# Patient Record
Sex: FEMALE | Race: WHITE | NOT HISPANIC OR LATINO | Employment: OTHER | URBAN - METROPOLITAN AREA
[De-identification: names, ages, dates, MRNs, and addresses within clinical notes are randomized per-mention and may not be internally consistent; named-entity substitution may affect disease eponyms.]

---

## 2023-02-14 PROBLEM — Z85.828 HISTORY OF SKIN CANCER: Status: ACTIVE | Noted: 2023-02-14

## 2023-02-14 PROBLEM — E78.5 HYPERLIPIDEMIA: Status: ACTIVE | Noted: 2023-02-14

## 2023-02-14 PROBLEM — G89.29 CHRONIC PAIN OF RIGHT KNEE: Status: ACTIVE | Noted: 2023-02-14

## 2023-02-14 PROBLEM — R41.3 MEMORY CHANGES: Status: ACTIVE | Noted: 2023-02-14

## 2023-02-14 PROBLEM — F39 MOOD DISORDER (CMS-HCC): Status: ACTIVE | Noted: 2023-02-14

## 2023-02-14 PROBLEM — M81.0 OSTEOPOROSIS: Status: ACTIVE | Noted: 2023-02-14

## 2023-02-14 PROBLEM — R51.9 HEADACHE, CHRONIC DAILY: Status: ACTIVE | Noted: 2023-02-14

## 2023-02-14 PROBLEM — N95.2 ATROPHIC VAGINITIS: Status: ACTIVE | Noted: 2023-02-14

## 2023-02-14 PROBLEM — I10 HTN (HYPERTENSION): Status: ACTIVE | Noted: 2023-02-14

## 2023-02-14 PROBLEM — H40.9 GLAUCOMA: Status: ACTIVE | Noted: 2023-02-14

## 2023-02-14 PROBLEM — M25.561 CHRONIC PAIN OF RIGHT KNEE: Status: ACTIVE | Noted: 2023-02-14

## 2023-02-14 PROBLEM — F41.9 ANXIETY: Status: ACTIVE | Noted: 2023-02-14

## 2023-02-14 PROBLEM — E03.9 HYPOTHYROIDISM: Status: ACTIVE | Noted: 2023-02-14

## 2023-02-14 PROBLEM — N18.31 CHRONIC KIDNEY DISEASE, STAGE 3A (MULTI): Status: ACTIVE | Noted: 2023-02-14

## 2023-02-14 PROBLEM — N81.89 PELVIC FLOOR WEAKNESS: Status: ACTIVE | Noted: 2023-02-14

## 2023-02-14 PROBLEM — L65.9 ALOPECIA: Status: ACTIVE | Noted: 2023-02-14

## 2023-02-14 PROBLEM — M53.0 CERVICOCRANIAL SYNDROME: Status: ACTIVE | Noted: 2023-02-14

## 2023-02-14 PROBLEM — Z98.41 STATUS POST RIGHT CATARACT EXTRACTION: Status: ACTIVE | Noted: 2023-02-14

## 2023-02-14 PROBLEM — F39 AFFECTIVE PSYCHOSIS (CMS-HCC): Status: ACTIVE | Noted: 2023-02-14

## 2023-02-14 RX ORDER — LEVOTHYROXINE SODIUM 112 UG/1
112 TABLET ORAL
COMMUNITY
Start: 2017-02-23 | End: 2023-05-05

## 2023-02-14 RX ORDER — AMLODIPINE BESYLATE 5 MG/1
5 TABLET ORAL DAILY
COMMUNITY
Start: 2021-07-26 | End: 2023-05-05

## 2023-02-14 RX ORDER — TIZANIDINE 4 MG/1
4 TABLET ORAL
COMMUNITY
Start: 2021-10-21 | End: 2024-05-15 | Stop reason: WASHOUT

## 2023-02-14 RX ORDER — IBUPROFEN 200 MG
200 TABLET ORAL 2 TIMES DAILY
COMMUNITY
Start: 2021-10-13 | End: 2024-03-11

## 2023-02-14 RX ORDER — GLUCOSAMINE/MSM/CHONDROIT SULF 500-166.6
1 TABLET ORAL DAILY
COMMUNITY
Start: 2021-10-13 | End: 2023-10-26 | Stop reason: ALTCHOICE

## 2023-02-14 RX ORDER — OMEPRAZOLE 20 MG/1
20 CAPSULE, DELAYED RELEASE ORAL DAILY
COMMUNITY
Start: 2017-02-23 | End: 2023-04-25

## 2023-02-14 RX ORDER — QUETIAPINE FUMARATE 100 MG/1
100 TABLET, FILM COATED ORAL DAILY
COMMUNITY
End: 2023-10-26 | Stop reason: ALTCHOICE

## 2023-02-14 RX ORDER — PERPHENAZINE/AMITRIPTYLINE HCL 2 MG-25 MG
TABLET ORAL
COMMUNITY
End: 2023-04-25

## 2023-02-14 RX ORDER — ACETAMINOPHEN 500 MG
500 TABLET ORAL EVERY 4 HOURS PRN
COMMUNITY
Start: 2021-10-13 | End: 2023-04-25

## 2023-02-14 RX ORDER — ATORVASTATIN CALCIUM 40 MG/1
40 TABLET, FILM COATED ORAL NIGHTLY
COMMUNITY
End: 2023-06-02 | Stop reason: SDUPTHER

## 2023-02-14 RX ORDER — LISINOPRIL 10 MG/1
10 TABLET ORAL DAILY
COMMUNITY
End: 2023-05-05

## 2023-04-25 ENCOUNTER — LAB (OUTPATIENT)
Dept: LAB | Facility: LAB | Age: 78
End: 2023-04-25
Payer: MEDICARE

## 2023-04-25 ENCOUNTER — OFFICE VISIT (OUTPATIENT)
Dept: PRIMARY CARE | Facility: CLINIC | Age: 78
End: 2023-04-25
Payer: MEDICARE

## 2023-04-25 VITALS
HEART RATE: 65 BPM | RESPIRATION RATE: 15 BRPM | OXYGEN SATURATION: 95 % | BODY MASS INDEX: 23.92 KG/M2 | TEMPERATURE: 98 F | WEIGHT: 135 LBS | SYSTOLIC BLOOD PRESSURE: 122 MMHG | HEIGHT: 63 IN | DIASTOLIC BLOOD PRESSURE: 68 MMHG

## 2023-04-25 DIAGNOSIS — F31.70 BIPOLAR DISORDER IN FULL REMISSION, MOST RECENT EPISODE UNSPECIFIED TYPE (MULTI): ICD-10-CM

## 2023-04-25 DIAGNOSIS — Z13.89 ENCOUNTER FOR SCREENING FOR OTHER DISORDER: ICD-10-CM

## 2023-04-25 DIAGNOSIS — Z11.59 ENCOUNTER FOR HEPATITIS C SCREENING TEST FOR LOW RISK PATIENT: ICD-10-CM

## 2023-04-25 DIAGNOSIS — R09.89 BRUIT OF LEFT CAROTID ARTERY: ICD-10-CM

## 2023-04-25 DIAGNOSIS — I10 HYPERTENSION, UNSPECIFIED TYPE: ICD-10-CM

## 2023-04-25 DIAGNOSIS — N18.31 CHRONIC KIDNEY DISEASE, STAGE 3A (MULTI): ICD-10-CM

## 2023-04-25 DIAGNOSIS — Z00.00 ENCOUNTER FOR MEDICARE ANNUAL WELLNESS EXAM: Primary | ICD-10-CM

## 2023-04-25 DIAGNOSIS — Z00.00 ENCOUNTER FOR MEDICARE ANNUAL WELLNESS EXAM: ICD-10-CM

## 2023-04-25 DIAGNOSIS — K21.9 GASTROESOPHAGEAL REFLUX DISEASE WITHOUT ESOPHAGITIS: ICD-10-CM

## 2023-04-25 DIAGNOSIS — I65.23 ATHEROSCLEROSIS OF BOTH CAROTID ARTERIES: ICD-10-CM

## 2023-04-25 DIAGNOSIS — E78.5 HYPERLIPIDEMIA, UNSPECIFIED HYPERLIPIDEMIA TYPE: ICD-10-CM

## 2023-04-25 DIAGNOSIS — E03.9 HYPOTHYROIDISM, UNSPECIFIED TYPE: ICD-10-CM

## 2023-04-25 PROBLEM — H40.033 ANATOMICAL NARROW ANGLE BORDERLINE GLAUCOMA OF BOTH EYES: Status: ACTIVE | Noted: 2017-10-30

## 2023-04-25 LAB
ANION GAP IN SER/PLAS: 12 MMOL/L (ref 10–20)
CALCIUM (MG/DL) IN SER/PLAS: 9.3 MG/DL (ref 8.6–10.6)
CARBON DIOXIDE, TOTAL (MMOL/L) IN SER/PLAS: 31 MMOL/L (ref 21–32)
CHLORIDE (MMOL/L) IN SER/PLAS: 104 MMOL/L (ref 98–107)
CHOLESTEROL (MG/DL) IN SER/PLAS: 186 MG/DL (ref 0–199)
CHOLESTEROL IN HDL (MG/DL) IN SER/PLAS: 50.3 MG/DL
CHOLESTEROL/HDL RATIO: 3.7
CREATININE (MG/DL) IN SER/PLAS: 1.12 MG/DL (ref 0.5–1.05)
GFR FEMALE: 51 ML/MIN/1.73M2
GLUCOSE (MG/DL) IN SER/PLAS: 94 MG/DL (ref 74–99)
HEPATITIS C VIRUS AB PRESENCE IN SERUM: NONREACTIVE
LDL: 99 MG/DL (ref 0–99)
POTASSIUM (MMOL/L) IN SER/PLAS: 4.5 MMOL/L (ref 3.5–5.3)
SODIUM (MMOL/L) IN SER/PLAS: 142 MMOL/L (ref 136–145)
THYROTROPIN (MIU/L) IN SER/PLAS BY DETECTION LIMIT <= 0.05 MIU/L: 3.1 MIU/L (ref 0.44–3.98)
TRIGLYCERIDE (MG/DL) IN SER/PLAS: 183 MG/DL (ref 0–149)
UREA NITROGEN (MG/DL) IN SER/PLAS: 21 MG/DL (ref 6–23)
VLDL: 37 MG/DL (ref 0–40)

## 2023-04-25 PROCEDURE — 80061 LIPID PANEL: CPT

## 2023-04-25 PROCEDURE — G0444 DEPRESSION SCREEN ANNUAL: HCPCS | Performed by: FAMILY MEDICINE

## 2023-04-25 PROCEDURE — G0439 PPPS, SUBSEQ VISIT: HCPCS | Performed by: FAMILY MEDICINE

## 2023-04-25 PROCEDURE — 36415 COLL VENOUS BLD VENIPUNCTURE: CPT

## 2023-04-25 PROCEDURE — 1160F RVW MEDS BY RX/DR IN RCRD: CPT | Performed by: FAMILY MEDICINE

## 2023-04-25 PROCEDURE — 80048 BASIC METABOLIC PNL TOTAL CA: CPT

## 2023-04-25 PROCEDURE — 84443 ASSAY THYROID STIM HORMONE: CPT

## 2023-04-25 PROCEDURE — 86803 HEPATITIS C AB TEST: CPT

## 2023-04-25 PROCEDURE — 1159F MED LIST DOCD IN RCRD: CPT | Performed by: FAMILY MEDICINE

## 2023-04-25 PROCEDURE — 1170F FXNL STATUS ASSESSED: CPT | Performed by: FAMILY MEDICINE

## 2023-04-25 PROCEDURE — 99397 PER PM REEVAL EST PAT 65+ YR: CPT | Performed by: FAMILY MEDICINE

## 2023-04-25 PROCEDURE — 3078F DIAST BP <80 MM HG: CPT | Performed by: FAMILY MEDICINE

## 2023-04-25 PROCEDURE — 1036F TOBACCO NON-USER: CPT | Performed by: FAMILY MEDICINE

## 2023-04-25 PROCEDURE — 3074F SYST BP LT 130 MM HG: CPT | Performed by: FAMILY MEDICINE

## 2023-04-25 RX ORDER — ASPIRIN 81 MG/1
81 TABLET ORAL DAILY
Qty: 30 TABLET | Refills: 11 | Status: SHIPPED | OUTPATIENT
Start: 2023-04-25 | End: 2024-06-03

## 2023-04-25 RX ORDER — OMEPRAZOLE 20 MG/1
TABLET, DELAYED RELEASE ORAL
COMMUNITY
End: 2023-10-26 | Stop reason: ALTCHOICE

## 2023-04-25 ASSESSMENT — PATIENT HEALTH QUESTIONNAIRE - PHQ9
2. FEELING DOWN, DEPRESSED OR HOPELESS: SEVERAL DAYS
SUM OF ALL RESPONSES TO PHQ9 QUESTIONS 1 AND 2: 2
1. LITTLE INTEREST OR PLEASURE IN DOING THINGS: SEVERAL DAYS

## 2023-04-25 ASSESSMENT — ACTIVITIES OF DAILY LIVING (ADL)
DOING_HOUSEWORK: INDEPENDENT
GROCERY_SHOPPING: INDEPENDENT
BATHING: INDEPENDENT
MANAGING_FINANCES: INDEPENDENT
DRESSING: INDEPENDENT
TAKING_MEDICATION: INDEPENDENT

## 2023-04-25 ASSESSMENT — LIFESTYLE VARIABLES: HOW MANY STANDARD DRINKS CONTAINING ALCOHOL DO YOU HAVE ON A TYPICAL DAY: PATIENT DOES NOT DRINK

## 2023-04-25 NOTE — PATIENT INSTRUCTIONS
Health Maintenance:  - Colonoscopy: fit kit done with insurance on 11/2/20- no need for testing, getting with insurance  - Mammogram: 2/9/23- repeat due in 1 yr  - PAP: n/a  - Bone density DEXA: 9/8/22- repeat in 2 yr  COVID completed 5 - including the bivalent  Due for Tdap- to get from pharm    Assessment/Plan   Problem List Items Addressed This Visit          Circulatory    HTN (hypertension)    Relevant Orders    Basic Metabolic Panel    Lipid Panel       Genitourinary    Chronic kidney disease, stage 3a    Current Assessment & Plan     Stable  Continue current meds         Relevant Orders    Basic Metabolic Panel    Lipid Panel       Other    Bipolar disorder in full remission (CMS/[facility])    Current Assessment & Plan     Stable- on meds  Followed by psych          Other Visit Diagnoses       Encounter for Medicare annual wellness exam    -  Primary    Relevant Orders    Basic Metabolic Panel    Lipid Panel    Encounter for screening for other disorder         Carotid bruit with Carotid arthrosclerosis   - Checking carotid ultrasound  - checking cholesterol as well  -continue the atorvastatin  - Add Aspirin           Please follow up in 6 months for HTN ans 1 yr for medicare wellness or as needed.       ** If labs or imaging ordered at today's visit, all the non-urgent results will be discussed at your next visit    If you have been referred for a special test or to a specialist please call  [phone] to schedule an appointment.  If you have any further questions, or if develop new or worsened symptoms, please give our office a call at [phone].

## 2023-04-25 NOTE — PROGRESS NOTES
"Subjective   Reason for Visit: Mar Bob is an 77 y.o. female here for a Medicare Wellness visit.     Past Medical, Surgical, and Family History reviewed and updated in chart.    Reviewed all medications by prescribing practitioner or clinical pharmacist (such as prescriptions, OTCs, herbal therapies and supplements) and documented in the medical record.    HPI    Patient Care Team:  Belgica Hardy DO as PCP - [facility]  Sirena Marx MD as PCP - [employer] P[facility]       Health Maintenance:  - Colonoscopy: fit kit done with insurance on 11/2/20- no need for testing, getting with insurance  - Mammogram: 2/9/23- repeat due in 1 yr  - PAP: n/a  - Bone density DEXA: 9/8/22- repeat in 2 yr  COVID completed 5 - including the bivalent  Due for Tdap- to get from pharm          Review of Systems  All systems reviewed and neg if not noted in the HPI above   Objective   Vitals:  /68 (Patient Position: Sitting)   Pulse 65   Temp 36.7 °C (98 °F)   Resp 15   Ht 1.6 m (5' 3\")   Wt 61.2 kg (135 lb)   SpO2 95%   BMI 23.91 kg/m²       Physical Exam  Pleasant  Eyes: conjunctiva non-icteric and eye lids are without obvious rash or drooping. Pupils are symmetric.   Ears, Nose, Mouth, and Throat: External ears and nose appear to be without deformity or rash. No lesions or masses noted. Hearing is grossly intact.   CV: RRR, no murmur  Carotids: no bruits  [place]oud nml  Pulm:CTA B/L  Abd: soft, NTTP, + BS  LE: no edema  Psychiatric: Alert, orientation to person, place, and time. Recent/remote memory as evidenced through face-to-face interaction and discussion appear grossly intact. Mood and affect are normal.          Assessment/Plan   Problem List Items Addressed This Visit          Circulatory    HTN (hypertension)    Relevant Orders    Basic Metabolic Panel    Lipid Panel       Genitourinary    Chronic kidney disease, stage 3a    Current Assessment & Plan     Stable  Continue current meds         Relevant " Orders    Basic Metabolic Panel    Lipid Panel       Other    Bipolar disorder in full remission (CMS/[facility])    Current Assessment & Plan     Stable- on meds  Followed by psych          Other Visit Diagnoses       Encounter for Medicare annual wellness exam    -  Primary    Relevant Orders    Basic Metabolic Panel    Lipid Panel    Encounter for screening for other disorder         Carotid bruit with Carotid arthrosclerosis   - Checking carotid ultrasound  - checking cholesterol as well  -continue the atorvastatin  - Add Asprin      Please follow up in 6 months for HTN ans 1 yr for medicare wellness or as needed.       ** If labs or imaging ordered at today's visit, all the non-urgent results will be discussed at your next visit    If you have been referred for a special test or to a specialist please call  [phone] to schedule an appointment.  If you have any further questions, or if develop new or worsened symptoms, please give our office a call at [phone].

## 2023-04-27 DIAGNOSIS — E03.9 HYPOTHYROIDISM, UNSPECIFIED TYPE: ICD-10-CM

## 2023-04-27 DIAGNOSIS — I15.9 SECONDARY HYPERTENSION: Primary | ICD-10-CM

## 2023-05-05 RX ORDER — LEVOTHYROXINE SODIUM 112 UG/1
TABLET ORAL
Qty: 90 TABLET | Refills: 1 | Status: SHIPPED | OUTPATIENT
Start: 2023-05-05 | End: 2023-05-25

## 2023-05-05 RX ORDER — LISINOPRIL 10 MG/1
TABLET ORAL
Qty: 90 TABLET | Refills: 2 | Status: SHIPPED | OUTPATIENT
Start: 2023-05-05 | End: 2023-05-25

## 2023-05-05 RX ORDER — AMLODIPINE BESYLATE 5 MG/1
TABLET ORAL
Qty: 90 TABLET | Refills: 2 | Status: SHIPPED | OUTPATIENT
Start: 2023-05-05 | End: 2023-05-25

## 2023-05-22 DIAGNOSIS — K21.9 GASTROESOPHAGEAL REFLUX DISEASE, UNSPECIFIED WHETHER ESOPHAGITIS PRESENT: ICD-10-CM

## 2023-05-22 DIAGNOSIS — E03.9 HYPOTHYROIDISM, UNSPECIFIED TYPE: ICD-10-CM

## 2023-05-22 DIAGNOSIS — I15.9 SECONDARY HYPERTENSION: ICD-10-CM

## 2023-05-25 RX ORDER — OMEPRAZOLE 20 MG/1
CAPSULE, DELAYED RELEASE ORAL
Qty: 90 CAPSULE | Refills: 0 | Status: SHIPPED | OUTPATIENT
Start: 2023-05-25 | End: 2023-09-25

## 2023-05-25 RX ORDER — AMLODIPINE BESYLATE 5 MG/1
TABLET ORAL
Qty: 90 TABLET | Refills: 3 | Status: SHIPPED
Start: 2023-05-25 | End: 2023-10-26 | Stop reason: ALTCHOICE

## 2023-05-25 RX ORDER — LISINOPRIL 10 MG/1
TABLET ORAL
Qty: 90 TABLET | Refills: 2 | Status: SHIPPED
Start: 2023-05-25 | End: 2023-10-26 | Stop reason: ALTCHOICE

## 2023-05-25 RX ORDER — LEVOTHYROXINE SODIUM 112 UG/1
TABLET ORAL
Qty: 90 TABLET | Refills: 3 | Status: SHIPPED | OUTPATIENT
Start: 2023-05-25

## 2023-06-02 DIAGNOSIS — E78.5 HYPERLIPIDEMIA, UNSPECIFIED HYPERLIPIDEMIA TYPE: Primary | ICD-10-CM

## 2023-06-02 NOTE — TELEPHONE ENCOUNTER
Rx refill. Atorvastatin. Out of medicine. [facility]. Patient stated all of her other medications were filled.

## 2023-06-05 RX ORDER — ATORVASTATIN CALCIUM 40 MG/1
40 TABLET, FILM COATED ORAL NIGHTLY
Qty: 90 TABLET | Refills: 1 | Status: SHIPPED
Start: 2023-06-05 | End: 2023-10-26 | Stop reason: ALTCHOICE

## 2023-09-13 ENCOUNTER — TELEPHONE (OUTPATIENT)
Dept: PHARMACY | Facility: HOSPITAL | Age: 78
End: 2023-09-13
Payer: MEDICARE

## 2023-09-13 NOTE — TELEPHONE ENCOUNTER
Population Health: Outreach by Ambulatory Pharmacy Team    Payor: [employer] [place]  Reason: Adherence (Past due to  refill)  Medication: Lisinopril 10 mg  Outcome: No answer (Unable to LVM)    Berenice Irene, InesD

## 2023-09-18 NOTE — TELEPHONE ENCOUNTER
I reviewed the progress note and agree with the resident’s findings and plans as written. Case discussed with resident.    Gurwinder Darling, PharmD

## 2023-09-20 PROBLEM — L81.4 OTHER MELANIN HYPERPIGMENTATION: Status: ACTIVE | Noted: 2023-07-12

## 2023-09-20 PROBLEM — C44.319 BASAL CELL CARCINOMA OF SKIN OF OTHER PARTS OF FACE: Status: ACTIVE | Noted: 2023-07-12

## 2023-09-20 PROBLEM — L90.5 SCAR CONDITION AND FIBROSIS OF SKIN: Status: ACTIVE | Noted: 2023-07-12

## 2023-09-20 PROBLEM — L30.9 DERMATITIS: Status: ACTIVE | Noted: 2023-09-20

## 2023-09-20 PROBLEM — D18.01 HEMANGIOMA OF SKIN AND SUBCUTANEOUS TISSUE: Status: ACTIVE | Noted: 2023-07-12

## 2023-09-20 PROBLEM — L23.9 ALLERGIC CONTACT DERMATITIS, UNSPECIFIED CAUSE: Status: ACTIVE | Noted: 2023-07-12

## 2023-09-20 PROBLEM — D48.5 NEOPLASM OF UNCERTAIN BEHAVIOR OF SKIN: Status: ACTIVE | Noted: 2023-07-12

## 2023-09-20 PROBLEM — L65.9 NONSCARRING HAIR LOSS, UNSPECIFIED: Status: ACTIVE | Noted: 2023-07-12

## 2023-09-20 PROBLEM — Z85.828 PERSONAL HISTORY OF OTHER MALIGNANT NEOPLASM OF SKIN: Status: ACTIVE | Noted: 2023-07-12

## 2023-09-20 PROBLEM — D22.5 MELANOCYTIC NEVI OF TRUNK: Status: ACTIVE | Noted: 2023-07-12
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
## 2023-09-20 PROBLEM — L91.8 OTHER HYPERTROPHIC DISORDERS OF THE SKIN: Status: ACTIVE | Noted: 2023-07-12

## 2023-09-20 PROBLEM — R21 RASH AND OTHER NONSPECIFIC SKIN ERUPTION: Status: ACTIVE | Noted: 2023-07-12

## 2023-09-20 PROBLEM — L82.1 OTHER SEBORRHEIC KERATOSIS: Status: ACTIVE | Noted: 2023-07-12

## 2023-09-20 PROBLEM — L53.9 ERYTHEMA: Status: ACTIVE | Noted: 2023-09-20

## 2023-09-20 PROBLEM — L82.0 INFLAMED SEBORRHEIC KERATOSIS: Status: ACTIVE | Noted: 2023-07-12

## 2023-09-20 RX ORDER — CALCIUM CARBONATE 600 MG
TABLET ORAL
COMMUNITY
End: 2024-05-15 | Stop reason: WASHOUT

## 2023-09-20 RX ORDER — TRIAMCINOLONE ACETONIDE 1 MG/G
CREAM TOPICAL
COMMUNITY
Start: 2022-12-06 | End: 2023-10-26 | Stop reason: ALTCHOICE

## 2023-09-20 RX ORDER — CIPROFLOXACIN 500 MG/1
500 TABLET ORAL 2 TIMES DAILY
COMMUNITY
Start: 2023-09-04 | End: 2023-09-11

## 2023-09-20 RX ORDER — AMLODIPINE BESYLATE 5 MG/1
1 TABLET ORAL DAILY
COMMUNITY
Start: 2021-07-26 | End: 2023-10-26 | Stop reason: ALTCHOICE

## 2023-09-20 RX ORDER — OMEPRAZOLE 20 MG/1
1 CAPSULE, DELAYED RELEASE ORAL DAILY
COMMUNITY
Start: 2017-02-23 | End: 2023-10-26 | Stop reason: ALTCHOICE

## 2023-09-20 RX ORDER — ATORVASTATIN CALCIUM 40 MG/1
1 TABLET, FILM COATED ORAL NIGHTLY
COMMUNITY
End: 2024-02-12

## 2023-09-20 RX ORDER — KETOCONAZOLE 20 MG/G
CREAM TOPICAL
COMMUNITY
Start: 2021-06-16 | End: 2023-10-26 | Stop reason: ALTCHOICE

## 2023-09-20 RX ORDER — PERPHENAZINE/AMITRIPTYLINE HCL 2 MG-25 MG
TABLET ORAL
COMMUNITY

## 2023-09-20 RX ORDER — TURMERIC 100 %
POWDER (GRAM) MISCELLANEOUS
COMMUNITY
End: 2023-10-26 | Stop reason: ALTCHOICE

## 2023-09-20 RX ORDER — LEVOTHYROXINE SODIUM 112 UG/1
1 TABLET ORAL DAILY
COMMUNITY
Start: 2017-02-23 | End: 2023-10-26 | Stop reason: ALTCHOICE

## 2023-09-20 RX ORDER — VIT C/E/ZN/COPPR/LUTEIN/ZEAXAN 250MG-90MG
CAPSULE ORAL
COMMUNITY

## 2023-09-20 RX ORDER — LISINOPRIL 10 MG/1
1 TABLET ORAL DAILY
COMMUNITY
End: 2023-10-26 | Stop reason: ALTCHOICE

## 2023-09-21 PROBLEM — D22.62 MELANOCYTIC NEVI OF LEFT UPPER LIMB, INCLUDING SHOULDER: Status: ACTIVE | Noted: 2023-07-12

## 2023-09-21 RX ORDER — QUETIAPINE FUMARATE 100 MG/1
1 TABLET, FILM COATED ORAL DAILY
COMMUNITY
End: 2023-10-26 | Stop reason: SDUPTHER

## 2023-09-21 RX ORDER — GARLIC 1000 MG
CAPSULE ORAL
COMMUNITY
End: 2023-10-26 | Stop reason: ALTCHOICE

## 2023-09-23 DIAGNOSIS — K21.9 GASTROESOPHAGEAL REFLUX DISEASE, UNSPECIFIED WHETHER ESOPHAGITIS PRESENT: ICD-10-CM

## 2023-09-23 DIAGNOSIS — I10 HYPERTENSION, UNSPECIFIED TYPE: Primary | ICD-10-CM

## 2023-09-25 RX ORDER — LISINOPRIL 10 MG/1
10 TABLET ORAL DAILY
Qty: 90 TABLET | Refills: 2 | Status: SHIPPED | OUTPATIENT
Start: 2023-09-25 | End: 2024-05-21

## 2023-09-25 RX ORDER — AMLODIPINE BESYLATE 5 MG/1
5 TABLET ORAL DAILY
Qty: 90 TABLET | Refills: 2 | Status: SHIPPED | OUTPATIENT
Start: 2023-09-25

## 2023-09-25 RX ORDER — OMEPRAZOLE 20 MG/1
CAPSULE, DELAYED RELEASE ORAL
Qty: 90 CAPSULE | Refills: 0 | Status: SHIPPED | OUTPATIENT
Start: 2023-09-25 | End: 2024-02-12

## 2023-10-19 ENCOUNTER — TELEPHONE (OUTPATIENT)
Dept: PHARMACY | Facility: HOSPITAL | Age: 78
End: 2023-10-19
Payer: MEDICARE

## 2023-10-19 NOTE — TELEPHONE ENCOUNTER
Population Health: Outreach by Ambulatory Pharmacy Team    Payor: [employer] [place]  Reason: Adherence  Medication: atorvastatin 40mg (refill due 9/3)   Outcome: Patient Reached: Claims Adherence, Patient has recently picked up refills for all her medications.     Renea Pearl

## 2023-10-26 ENCOUNTER — ANCILLARY PROCEDURE (OUTPATIENT)
Dept: RADIOLOGY | Facility: CLINIC | Age: 78
End: 2023-10-26
Payer: MEDICARE

## 2023-10-26 ENCOUNTER — LAB (OUTPATIENT)
Dept: LAB | Facility: LAB | Age: 78
End: 2023-10-26
Payer: MEDICARE

## 2023-10-26 ENCOUNTER — OFFICE VISIT (OUTPATIENT)
Dept: PRIMARY CARE | Facility: CLINIC | Age: 78
End: 2023-10-26
Payer: MEDICARE

## 2023-10-26 VITALS
HEIGHT: 63 IN | DIASTOLIC BLOOD PRESSURE: 60 MMHG | RESPIRATION RATE: 12 BRPM | TEMPERATURE: 96.7 F | WEIGHT: 135.6 LBS | BODY MASS INDEX: 24.03 KG/M2 | OXYGEN SATURATION: 98 % | HEART RATE: 64 BPM | SYSTOLIC BLOOD PRESSURE: 120 MMHG

## 2023-10-26 DIAGNOSIS — M25.562 CHRONIC PAIN OF BOTH KNEES: ICD-10-CM

## 2023-10-26 DIAGNOSIS — E78.1 HYPERTRIGLYCERIDEMIA: ICD-10-CM

## 2023-10-26 DIAGNOSIS — F39 AFFECTIVE PSYCHOSIS (CMS-HCC): ICD-10-CM

## 2023-10-26 DIAGNOSIS — G89.29 CHRONIC PAIN OF BOTH KNEES: ICD-10-CM

## 2023-10-26 DIAGNOSIS — I10 PRIMARY HYPERTENSION: Primary | ICD-10-CM

## 2023-10-26 DIAGNOSIS — E78.5 HYPERLIPIDEMIA, UNSPECIFIED HYPERLIPIDEMIA TYPE: ICD-10-CM

## 2023-10-26 DIAGNOSIS — Z12.31 ENCOUNTER FOR SCREENING MAMMOGRAM FOR BREAST CANCER: ICD-10-CM

## 2023-10-26 DIAGNOSIS — M79.645 PAIN IN FINGER OF BOTH HANDS: ICD-10-CM

## 2023-10-26 DIAGNOSIS — M25.561 CHRONIC PAIN OF BOTH KNEES: ICD-10-CM

## 2023-10-26 DIAGNOSIS — I10 PRIMARY HYPERTENSION: ICD-10-CM

## 2023-10-26 DIAGNOSIS — N18.31 CHRONIC KIDNEY DISEASE, STAGE 3A (MULTI): ICD-10-CM

## 2023-10-26 DIAGNOSIS — M79.644 PAIN IN FINGER OF BOTH HANDS: ICD-10-CM

## 2023-10-26 DIAGNOSIS — Z23 NEED FOR VACCINATION: ICD-10-CM
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
## 2023-10-26 PROBLEM — R21 RASH AND OTHER NONSPECIFIC SKIN ERUPTION: Status: RESOLVED | Noted: 2023-07-12 | Resolved: 2023-10-26

## 2023-10-26 PROBLEM — L82.1 OTHER SEBORRHEIC KERATOSIS: Status: RESOLVED | Noted: 2023-07-12 | Resolved: 2023-10-26

## 2023-10-26 LAB
ALBUMIN SERPL BCP-MCNC: 4.1 G/DL (ref 3.4–5)
ALP SERPL-CCNC: 83 U/L (ref 33–136)
ALT SERPL W P-5'-P-CCNC: 14 U/L (ref 7–45)
ANION GAP SERPL CALC-SCNC: 11 MMOL/L (ref 10–20)
AST SERPL W P-5'-P-CCNC: 19 U/L (ref 9–39)
BILIRUB SERPL-MCNC: 0.7 MG/DL (ref 0–1.2)
BUN SERPL-MCNC: 21 MG/DL (ref 6–23)
CALCIUM SERPL-MCNC: 9.2 MG/DL (ref 8.6–10.6)
CHLORIDE SERPL-SCNC: 106 MMOL/L (ref 98–107)
CHOLEST SERPL-MCNC: 146 MG/DL (ref 0–199)
CHOLESTEROL/HDL RATIO: 3.6
CO2 SERPL-SCNC: 27 MMOL/L (ref 21–32)
CREAT SERPL-MCNC: 1.03 MG/DL (ref 0.5–1.05)
GFR SERPL CREATININE-BSD FRML MDRD: 56 ML/MIN/1.73M*2
GLUCOSE SERPL-MCNC: 92 MG/DL (ref 74–99)
HDLC SERPL-MCNC: 40.3 MG/DL
LDLC SERPL CALC-MCNC: 70 MG/DL
NON HDL CHOLESTEROL: 106 MG/DL (ref 0–149)
POTASSIUM SERPL-SCNC: 4.2 MMOL/L (ref 3.5–5.3)
PROT SERPL-MCNC: 7.2 G/DL (ref 6.4–8.2)
SODIUM SERPL-SCNC: 140 MMOL/L (ref 136–145)
TRIGL SERPL-MCNC: 181 MG/DL (ref 0–149)
VLDL: 36 MG/DL (ref 0–40)

## 2023-10-26 PROCEDURE — 80061 LIPID PANEL: CPT

## 2023-10-26 PROCEDURE — 1126F AMNT PAIN NOTED NONE PRSNT: CPT | Performed by: FAMILY MEDICINE

## 2023-10-26 PROCEDURE — 3078F DIAST BP <80 MM HG: CPT | Performed by: FAMILY MEDICINE

## 2023-10-26 PROCEDURE — 73564 X-RAY EXAM KNEE 4 OR MORE: CPT | Mod: BILATERAL PROCEDURE | Performed by: RADIOLOGY

## 2023-10-26 PROCEDURE — 36415 COLL VENOUS BLD VENIPUNCTURE: CPT

## 2023-10-26 PROCEDURE — 80053 COMPREHEN METABOLIC PANEL: CPT

## 2023-10-26 PROCEDURE — 99214 OFFICE O/P EST MOD 30 MIN: CPT | Performed by: FAMILY MEDICINE

## 2023-10-26 PROCEDURE — 73120 X-RAY EXAM OF HAND: CPT | Mod: 50,FY

## 2023-10-26 PROCEDURE — 1036F TOBACCO NON-USER: CPT | Performed by: FAMILY MEDICINE

## 2023-10-26 PROCEDURE — 73120 X-RAY EXAM OF HAND: CPT | Mod: BILATERAL PROCEDURE | Performed by: RADIOLOGY

## 2023-10-26 PROCEDURE — 73564 X-RAY EXAM KNEE 4 OR MORE: CPT | Mod: 50

## 2023-10-26 PROCEDURE — 3074F SYST BP LT 130 MM HG: CPT | Performed by: FAMILY MEDICINE

## 2023-10-26 PROCEDURE — 1159F MED LIST DOCD IN RCRD: CPT | Performed by: FAMILY MEDICINE

## 2023-10-26 PROCEDURE — 1160F RVW MEDS BY RX/DR IN RCRD: CPT | Performed by: FAMILY MEDICINE

## 2023-10-26 RX ORDER — QUETIAPINE FUMARATE 100 MG/1
100 TABLET, FILM COATED ORAL DAILY
Qty: 90 TABLET | Refills: 0 | Status: SHIPPED | OUTPATIENT
Start: 2023-10-26 | End: 2023-12-15 | Stop reason: SDUPTHER

## 2023-10-26 ASSESSMENT — PATIENT HEALTH QUESTIONNAIRE - PHQ9
SUM OF ALL RESPONSES TO PHQ9 QUESTIONS 1 AND 2: 0
1. LITTLE INTEREST OR PLEASURE IN DOING THINGS: NOT AT ALL
2. FEELING DOWN, DEPRESSED OR HOPELESS: NOT AT ALL

## 2023-10-26 NOTE — PROGRESS NOTES
"Subjective   Patient ID: Mar Bob is a 78 y.o. female who presents for Follow-up (Pt is here for HLD fuv.).    HPI     Health Maintenance:  - Colonoscopy: fit kit done with insurance on 11/2/20- no need for testing, getting with insurance  - Mammogram: 2/9/23- repeat due in 1 yr  - PAP: n/a  - Bone density DEXA: 9/8/22- repeat in 2 yr    Completed RSV and flu from pharm  Getting COVID today      C/o B/l hands/ fingers with soreness and right knee with pain  Thinking due to arthritis- (does not want PT or medications at this time)  Noted to have nodules  on fingers (DIP  Hard to type on computer due to finger pain    HTN  BP at goal today in office.  Using medications without issues.  Denies CP, SOB, palpitations, change in vision, dizziness, N/V.      Review of Systems  All systems reviewed and neg if not noted in the HPI above       Objective   /60 (Patient Position: Sitting)   Pulse 64   Temp 35.9 °C (96.7 °F)   Resp 12   Ht 1.6 m (5' 3\")   Wt 61.5 kg (135 lb 9.6 oz)   SpO2 98%   BMI 24.02 kg/m²     Physical Exam  Vitals reviewed.   Constitutional:       Appearance: Normal appearance.   HENT:      Head: Normocephalic.   Eyes:      Extraocular Movements: Extraocular movements intact.   Cardiovascular:      Rate and Rhythm: Normal rate and regular rhythm.      Heart sounds: Normal heart sounds. No murmur heard.  Pulmonary:      Effort: Pulmonary effort is normal. No respiratory distress.      Breath sounds: Normal breath sounds. No wheezing.   Musculoskeletal:      Right hand: Deformity and tenderness present. Decreased range of motion.      Left hand: Deformity and tenderness present. Decreased range of motion.      Right knee: Deformity present. No effusion. Decreased range of motion. Tenderness present.      Left knee: Deformity present. No effusion. Decreased range of motion. Tenderness present.   Skin:     General: Skin is warm and dry.   Neurological:      General: No focal deficit present. "      Mental Status: She is alert and oriented to person, place, and time.   Psychiatric:         Mood and Affect: Mood normal.         Behavior: Behavior normal.         Thought Content: Thought content normal.         Judgment: Judgment normal.         Assessment/Plan   Problem List Items Addressed This Visit             ICD-10-CM    Chronic kidney disease, stage 3a (CMS/HCC) N18.31    HTN (hypertension) - Primary I10    Relevant Orders    Comprehensive Metabolic Panel    Lipid Panel    Hyperlipidemia E78.5    Relevant Orders    Comprehensive Metabolic Panel    Lipid Panel     Other Visit Diagnoses         Codes    Need for vaccination     Z23    Hypertriglyceridemia     E78.1    Relevant Orders    Comprehensive Metabolic Panel    Lipid Panel    Encounter for screening mammogram for breast cancer     Z12.31    Relevant Orders    BI mammo bilateral screening tomosynthesis    Chronic pain of both knees     M25.561, M25.562, G89.29    Relevant Orders    XR knee 4+ views bilateral    Pain in finger of both hands     M79.645, M79.644    Relevant Orders    XR hand 1-2 views bilateral          Please follow up in 6months for medicare wellness or as needed.

## 2023-10-26 NOTE — PATIENT INSTRUCTIONS
COVID,tdap and pneumonia 23 can get with your local pharm    Knee and hand pain  - ok to add Turmeric Curcumin  450mg  - Topical creams- Voltaren gel, Salonpas, Icy hot, Bio freeze, Capsaicin, Asper cream, or Tiger balm (these are all over the counter)   - xrays of hands and knee        Please follow up in 6months for medicare wellness or as needed.       ** If labs or imaging ordered at today's visit, all the non-urgent results will be discussed at your next visit    If you have been referred for a special test or to a specialist please call  [phone] to schedule an appointment.  If you have any further questions, or if develop new or worsened symptoms, please give our office a call at [phone].

## 2023-11-06 ENCOUNTER — APPOINTMENT (OUTPATIENT)
Dept: BEHAVIORAL HEALTH | Facility: CLINIC | Age: 78
End: 2023-11-06
Payer: MEDICARE

## 2023-12-15 ENCOUNTER — TELEMEDICINE (OUTPATIENT)
Dept: BEHAVIORAL HEALTH | Facility: CLINIC | Age: 78
End: 2023-12-15
Payer: MEDICARE

## 2023-12-15 DIAGNOSIS — F39 AFFECTIVE PSYCHOSIS (CMS-HCC): ICD-10-CM

## 2023-12-15 DIAGNOSIS — F31.78 BIPOLAR DISORDER, IN FULL REMISSION, MOST RECENT EPISODE MIXED (MULTI): ICD-10-CM

## 2023-12-15 PROCEDURE — 99214 OFFICE O/P EST MOD 30 MIN: CPT | Performed by: PSYCHIATRY & NEUROLOGY

## 2023-12-15 RX ORDER — QUETIAPINE FUMARATE 100 MG/1
100 TABLET, FILM COATED ORAL DAILY
Qty: 90 TABLET | Refills: 1 | Status: SHIPPED | OUTPATIENT
Start: 2023-12-15 | End: 2024-05-08 | Stop reason: SDUPTHER

## 2023-12-15 SDOH — HEALTH STABILITY: PHYSICAL HEALTH: ON AVERAGE, HOW MANY DAYS PER WEEK DO YOU ENGAGE IN MODERATE TO STRENUOUS EXERCISE (LIKE A BRISK WALK)?: 7 DAYS

## 2023-12-15 SDOH — ECONOMIC STABILITY: TRANSPORTATION INSECURITY
IN THE PAST 12 MONTHS, HAS LACK OF TRANSPORTATION KEPT YOU FROM MEETINGS, WORK, OR FROM GETTING THINGS NEEDED FOR DAILY LIVING?: NO

## 2023-12-15 SDOH — ECONOMIC STABILITY: TRANSPORTATION INSECURITY
IN THE PAST 12 MONTHS, HAS THE LACK OF TRANSPORTATION KEPT YOU FROM MEDICAL APPOINTMENTS OR FROM GETTING MEDICATIONS?: NO

## 2023-12-15 SDOH — ECONOMIC STABILITY: INCOME INSECURITY: IN THE LAST 12 MONTHS, WAS THERE A TIME WHEN YOU WERE NOT ABLE TO PAY THE MORTGAGE OR RENT ON TIME?: NO

## 2023-12-15 SDOH — HEALTH STABILITY: PHYSICAL HEALTH: ON AVERAGE, HOW MANY MINUTES DO YOU ENGAGE IN EXERCISE AT THIS LEVEL?: 30 MIN

## 2023-12-15 SDOH — ECONOMIC STABILITY: FOOD INSECURITY: WITHIN THE PAST 12 MONTHS, THE FOOD YOU BOUGHT JUST DIDN'T LAST AND YOU DIDN'T HAVE MONEY TO GET MORE.: NEVER TRUE

## 2023-12-15 SDOH — ECONOMIC STABILITY: HOUSING INSECURITY
IN THE LAST 12 MONTHS, WAS THERE A TIME WHEN YOU DID NOT HAVE A STEADY PLACE TO SLEEP OR SLEPT IN A SHELTER (INCLUDING NOW)?: NO

## 2023-12-15 SDOH — ECONOMIC STABILITY: FOOD INSECURITY: WITHIN THE PAST 12 MONTHS, YOU WORRIED THAT YOUR FOOD WOULD RUN OUT BEFORE YOU GOT MONEY TO BUY MORE.: NEVER TRUE

## 2023-12-15 SDOH — ECONOMIC STABILITY: GENERAL
WHICH OF THE FOLLOWING DO YOU KNOW HOW TO USE AND HAVE ACCESS TO EVERY DAY? (CHOOSE ALL THAT APPLY): DESKTOP COMPUTER, LAPTOP COMPUTER, OR TABLET WITH BROADBAND INTERNET CONNECTION;SMARTPHONE WITH CELLULAR DATA PLAN

## 2023-12-15 SDOH — ECONOMIC STABILITY: GENERAL
WHICH OF THE FOLLOWING WOULD YOU LIKE TO GET CONNECTED TO IN ORDER TO RECEIVE A DISCOUNT OR FOR FREE? (CHOOSE ALL THAT APPLY): NONE OF THESE

## 2023-12-15 SDOH — ECONOMIC STABILITY: HOUSING INSECURITY: IN THE LAST 12 MONTHS, HOW MANY PLACES HAVE YOU LIVED?: 1

## 2023-12-15 ASSESSMENT — SOCIAL DETERMINANTS OF HEALTH (SDOH)
WITHIN THE LAST YEAR, HAVE YOU BEEN AFRAID OF YOUR PARTNER OR EX-PARTNER?: NO
HOW OFTEN DO YOU ATTENT MEETINGS OF THE CLUB OR ORGANIZATION YOU BELONG TO?: MORE THAN 4 TIMES PER YEAR
HOW OFTEN DO YOU GET TOGETHER WITH FRIENDS OR RELATIVES?: MORE THAN THREE TIMES A WEEK
DO YOU BELONG TO ANY CLUBS OR ORGANIZATIONS SUCH AS CHURCH GROUPS UNIONS, FRATERNAL OR ATHLETIC GROUPS, OR SCHOOL GROUPS?: YES
IN THE PAST 12 MONTHS, HAS THE ELECTRIC, GAS, OIL, OR WATER COMPANY THREATENED TO SHUT OFF SERVICE IN YOUR HOME?: NO
HOW OFTEN DO YOU ATTENT MEETINGS OF THE CLUB OR ORGANIZATION YOU BELONG TO?: MORE THAN 4 TIMES PER YEAR
HOW OFTEN DO YOU GET TOGETHER WITH FRIENDS OR RELATIVES?: MORE THAN THREE TIMES A WEEK
WITHIN THE LAST YEAR, HAVE YOU BEEN HUMILIATED OR EMOTIONALLY ABUSED IN OTHER WAYS BY YOUR PARTNER OR EX-PARTNER?: NO
IN A TYPICAL WEEK, HOW MANY TIMES DO YOU TALK ON THE PHONE WITH FAMILY, FRIENDS, OR NEIGHBORS?: MORE THAN THREE TIMES A WEEK
IN A TYPICAL WEEK, HOW MANY TIMES DO YOU TALK ON THE PHONE WITH FAMILY, FRIENDS, OR NEIGHBORS?: MORE THAN THREE TIMES A WEEK
WITHIN THE LAST YEAR, HAVE YOU BEEN KICKED, HIT, SLAPPED, OR OTHERWISE PHYSICALLY HURT BY YOUR PARTNER OR EX-PARTNER?: NO
WITHIN THE LAST YEAR, HAVE TO BEEN RAPED OR FORCED TO HAVE ANY KIND OF SEXUAL ACTIVITY BY YOUR PARTNER OR EX-PARTNER?: NO
HOW OFTEN DO YOU ATTEND CHURCH OR RELIGIOUS SERVICES?: MORE THAN 4 TIMES PER YEAR
DO YOU BELONG TO ANY CLUBS OR ORGANIZATIONS SUCH AS CHURCH GROUPS UNIONS, FRATERNAL OR ATHLETIC GROUPS, OR SCHOOL GROUPS?: YES
HOW HARD IS IT FOR YOU TO PAY FOR THE VERY BASICS LIKE FOOD, HOUSING, MEDICAL CARE, AND HEATING?: NOT HARD AT ALL

## 2023-12-15 ASSESSMENT — LIFESTYLE VARIABLES
HOW MANY STANDARD DRINKS CONTAINING ALCOHOL DO YOU HAVE ON A TYPICAL DAY: PATIENT DOES NOT DRINK
HOW OFTEN DO YOU HAVE SIX OR MORE DRINKS ON ONE OCCASION: NEVER
SKIP TO QUESTIONS 9-10: 1
HOW OFTEN DO YOU HAVE A DRINK CONTAINING ALCOHOL: NEVER
AUDIT-C TOTAL SCORE: 0

## 2023-12-15 NOTE — PROGRESS NOTES
Subjective   Patient ID: Mar Bob is a 78 y.o. female who presents for No chief complaint on file. I am doing     The patient is alert, fully oriented, language is intact, and recent and remote memory intact. The patient denies any suicidal or homicidal ideation or plans. The patient presents with no depressive, manic or psychotic symptoms. Thought is logical and clear. No disturbances of judgment or insight are exhibited. No behavioral disturbances are present on examination.    PMH: The patient reports a history of a break with reality with significant mood disturbance in 1997.     FH: The patient's sister has had similar mood disturbance and break with reality leading to a hospitalization. She was diagnosed with bipolar. One niece committed suicide. This niece is one of twins and both have suffered from mood disturbance. The paternal grandmother also had depression with fear possible psychosis. The acute illness happened after menopause with both the patient and her sister.         Review of Systems   Neurological:         The patient is alert, fully oriented, language is intact, and recent and remote memory intact. The patient denies any suicidal or homicidal ideation or plans. The patient presents with no depressive, manic or psychotic symptoms. Thought is logical and clear. No disturbances of judgment or insight are exhibited. No behavioral disturbances are present on examination.       Psychiatric/Behavioral:          The patient is alert, fully oriented, language is intact, and recent and remote memory intact. The patient denies any suicidal or homicidal ideation or plans. The patient presents with no depressive, manic or psychotic symptoms. Thought is logical and clear. No disturbances of judgment or insight are exhibited. No behavioral disturbances are present on examination.           Objective   Physical Exam  Neurological:      General: No focal deficit present.      Mental Status: She is alert  and oriented to person, place, and time. Mental status is at baseline.      Comments: The patient is alert, fully oriented, language is intact, and recent and remote memory intact. The patient denies any suicidal or homicidal ideation or plans. The patient presents with no depressive, manic or psychotic symptoms. Thought is logical and clear. No disturbances of judgment or insight are exhibited. No behavioral disturbances are present on examination.       Psychiatric:         Mood and Affect: Mood normal.         Behavior: Behavior normal.         Thought Content: Thought content normal.         Judgment: Judgment normal.      Comments: The patient is alert, fully oriented, language is intact, and recent and remote memory intact. The patient denies any suicidal or homicidal ideation or plans. The patient presents with no depressive, manic or psychotic symptoms. Thought is logical and clear. No disturbances of judgment or insight are exhibited. No behavioral disturbances are present on examination.             Lab Review:       Assessment/Plan   The risks, benefits & alternatives to the medications prescribed were explained to you today. You were able to understand & repeat these risks, benefits & alternatives to this prescribed medication. You have agreed to proceed with treatment with the medications discussed based on the conclusion that the benefit outweighs the risks of this treatment regimen: continue quetiapine 100 mg daily for treatment of bipolar affective disorder. Follow up in 6 months or sooner if needed.

## 2024-02-07 DIAGNOSIS — E78.5 HYPERLIPIDEMIA, UNSPECIFIED HYPERLIPIDEMIA TYPE: Primary | ICD-10-CM

## 2024-02-07 DIAGNOSIS — K21.9 GASTROESOPHAGEAL REFLUX DISEASE, UNSPECIFIED WHETHER ESOPHAGITIS PRESENT: ICD-10-CM

## 2024-02-12 RX ORDER — ATORVASTATIN CALCIUM 40 MG/1
40 TABLET, FILM COATED ORAL NIGHTLY
Qty: 90 TABLET | Refills: 1 | Status: SHIPPED | OUTPATIENT
Start: 2024-02-12

## 2024-02-12 RX ORDER — OMEPRAZOLE 20 MG/1
CAPSULE, DELAYED RELEASE ORAL
Qty: 90 CAPSULE | Refills: 0 | Status: SHIPPED | OUTPATIENT
Start: 2024-02-12 | End: 2024-05-21

## 2024-03-05 ENCOUNTER — HOSPITAL ENCOUNTER (OUTPATIENT)
Dept: RADIOLOGY | Facility: CLINIC | Age: 79
Discharge: HOME | End: 2024-03-05
Payer: MEDICARE

## 2024-03-05 VITALS — WEIGHT: 130 LBS | HEIGHT: 63 IN | BODY MASS INDEX: 23.04 KG/M2

## 2024-03-05 DIAGNOSIS — Z12.31 ENCOUNTER FOR SCREENING MAMMOGRAM FOR BREAST CANCER: ICD-10-CM

## 2024-03-05 PROCEDURE — 77063 BREAST TOMOSYNTHESIS BI: CPT

## 2024-03-05 PROCEDURE — 77067 SCR MAMMO BI INCL CAD: CPT | Performed by: RADIOLOGY

## 2024-03-05 PROCEDURE — 77063 BREAST TOMOSYNTHESIS BI: CPT | Performed by: RADIOLOGY

## 2024-03-11 ENCOUNTER — OFFICE VISIT (OUTPATIENT)
Dept: OPHTHALMOLOGY | Facility: CLINIC | Age: 79
End: 2024-03-11
Payer: MEDICARE

## 2024-03-11 DIAGNOSIS — H35.3131 EARLY DRY STAGE NONEXUDATIVE AGE-RELATED MACULAR DEGENERATION OF BOTH EYES: ICD-10-CM

## 2024-03-11 DIAGNOSIS — H40.033 ANATOMICAL NARROW ANGLE BORDERLINE GLAUCOMA OF BOTH EYES: Primary | ICD-10-CM

## 2024-03-11 PROCEDURE — 92134 CPTRZ OPH DX IMG PST SGM RTA: CPT | Mod: BILATERAL PROCEDURE | Performed by: OPHTHALMOLOGY

## 2024-03-11 PROCEDURE — 99214 OFFICE O/P EST MOD 30 MIN: CPT | Performed by: OPHTHALMOLOGY

## 2024-03-11 RX ORDER — TURMERIC 100 %
POWDER (GRAM) MISCELLANEOUS
COMMUNITY

## 2024-03-11 RX ORDER — GLUCOSAMINE/MSM/CHONDROIT SULF 500-166.6
TABLET ORAL
COMMUNITY
Start: 2021-10-13 | End: 2024-05-15 | Stop reason: WASHOUT

## 2024-03-11 RX ORDER — GARLIC 1000 MG
CAPSULE ORAL
COMMUNITY

## 2024-03-11 RX ORDER — FLAXSEED OIL 1000 MG
CAPSULE ORAL
COMMUNITY

## 2024-03-11 ASSESSMENT — VISUAL ACUITY
OS_CC+: -1
OS_CC: 20/20
OD_CC+: -1
OD_CC: 20/40
METHOD: SNELLEN - LINEAR

## 2024-03-11 ASSESSMENT — ENCOUNTER SYMPTOMS
HEMATOLOGIC/LYMPHATIC NEGATIVE: 0
GASTROINTESTINAL NEGATIVE: 0
MUSCULOSKELETAL NEGATIVE: 0
ALLERGIC/IMMUNOLOGIC NEGATIVE: 0
NEUROLOGICAL NEGATIVE: 0
PSYCHIATRIC NEGATIVE: 0
CONSTITUTIONAL NEGATIVE: 0
ENDOCRINE NEGATIVE: 0
RESPIRATORY NEGATIVE: 0
CARDIOVASCULAR NEGATIVE: 0
EYES NEGATIVE: 0

## 2024-03-11 ASSESSMENT — PACHYMETRY
OS_CT(UM): 598
OD_CT(UM): 601

## 2024-03-11 ASSESSMENT — TONOMETRY
OS_IOP_MMHG: 13
IOP_METHOD: GOLDMANN APPLANATION
OD_IOP_MMHG: 15

## 2024-03-11 ASSESSMENT — CONF VISUAL FIELD
OD_INFERIOR_NASAL_RESTRICTION: 0
OS_INFERIOR_NASAL_RESTRICTION: 0
OD_NORMAL: 1
OS_INFERIOR_TEMPORAL_RESTRICTION: 0
OD_SUPERIOR_TEMPORAL_RESTRICTION: 0
OS_SUPERIOR_TEMPORAL_RESTRICTION: 0
OD_INFERIOR_TEMPORAL_RESTRICTION: 0
OD_SUPERIOR_NASAL_RESTRICTION: 0
OS_NORMAL: 1
OS_SUPERIOR_NASAL_RESTRICTION: 0

## 2024-03-11 ASSESSMENT — EXTERNAL EXAM - RIGHT EYE: OD_EXAM: NORMAL

## 2024-03-11 ASSESSMENT — EXTERNAL EXAM - LEFT EYE: OS_EXAM: NORMAL

## 2024-03-11 ASSESSMENT — SLIT LAMP EXAM - LIDS
COMMENTS: GOOD POSITION
COMMENTS: GOOD POSITION

## 2024-03-11 ASSESSMENT — CUP TO DISC RATIO: OS_RATIO: .3

## 2024-03-11 NOTE — PROGRESS NOTES
Pseudophakia OU     Doing well, VA OD limited by amblyopia     now s/p YAG cap, monitor          hx of anatomically narrow angles, both eyes:     s.p LPI with Dr. Gonzalez at [facility] normal     nerves healthy    OCT, Retina - OU - Both Eyes          Right Eye  Quality was good. Findings include normal foveal contour.     Left Eye  Quality was good. Findings include normal foveal contour.     Notes  Small subretinal deposits         OCT, Optic Nerve - OU - Both Eyes          Right Eye  Images reviewed and comparison made to baseline.     Left Eye  Images reviewed and comparison made to baseline.     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 Ybarra for further care  Amy MAK    
,DirectAddress_Unknown

## 2024-04-08 ENCOUNTER — APPOINTMENT (OUTPATIENT)
Dept: PRIMARY CARE | Facility: CLINIC | Age: 79
End: 2024-04-08
Payer: MEDICARE

## 2024-04-09 ENCOUNTER — APPOINTMENT (OUTPATIENT)
Dept: PRIMARY CARE | Facility: CLINIC | Age: 79
End: 2024-04-09
Payer: MEDICARE

## 2024-05-08 DIAGNOSIS — F39 AFFECTIVE PSYCHOSIS (CMS-HCC): ICD-10-CM

## 2024-05-09 RX ORDER — QUETIAPINE FUMARATE 100 MG/1
100 TABLET, FILM COATED ORAL DAILY
Qty: 90 TABLET | Refills: 0 | Status: SHIPPED | OUTPATIENT
Start: 2024-05-09 | End: 2025-05-04

## 2024-05-15 ENCOUNTER — OFFICE VISIT (OUTPATIENT)
Dept: PRIMARY CARE | Facility: CLINIC | Age: 79
End: 2024-05-15
Payer: MEDICARE

## 2024-05-15 VITALS
HEART RATE: 77 BPM | SYSTOLIC BLOOD PRESSURE: 128 MMHG | HEIGHT: 63 IN | OXYGEN SATURATION: 95 % | TEMPERATURE: 97 F | DIASTOLIC BLOOD PRESSURE: 64 MMHG | RESPIRATION RATE: 15 BRPM | WEIGHT: 134.2 LBS | BODY MASS INDEX: 23.78 KG/M2

## 2024-05-15 DIAGNOSIS — Z13.89 ENCOUNTER FOR SCREENING FOR OTHER DISORDER: ICD-10-CM

## 2024-05-15 DIAGNOSIS — F39 AFFECTIVE PSYCHOSIS (CMS-HCC): ICD-10-CM

## 2024-05-15 DIAGNOSIS — N18.31 CHRONIC KIDNEY DISEASE, STAGE 3A (MULTI): ICD-10-CM

## 2024-05-15 DIAGNOSIS — Z00.00 ENCOUNTER FOR MEDICARE ANNUAL WELLNESS EXAM: Primary | ICD-10-CM

## 2024-05-15 DIAGNOSIS — E03.9 HYPOTHYROIDISM, UNSPECIFIED TYPE: ICD-10-CM

## 2024-05-15 DIAGNOSIS — R09.89 BRUIT OF RIGHT CAROTID ARTERY: ICD-10-CM

## 2024-05-15 DIAGNOSIS — J43.9 PULMONARY EMPHYSEMA, UNSPECIFIED EMPHYSEMA TYPE (MULTI): ICD-10-CM

## 2024-05-15 PROBLEM — R05.9 COUGH: Status: RESOLVED | Noted: 2024-05-15 | Resolved: 2024-05-15

## 2024-05-15 PROBLEM — M79.644 PAIN IN FINGER OF BOTH HANDS: Status: ACTIVE | Noted: 2024-05-15

## 2024-05-15 PROBLEM — R50.9 FEVER: Status: ACTIVE | Noted: 2024-05-15

## 2024-05-15 PROBLEM — D75.839 THROMBOCYTHEMIA: Status: ACTIVE | Noted: 2019-09-17

## 2024-05-15 PROBLEM — J18.9 PNEUMONIA: Status: RESOLVED | Noted: 2024-05-15 | Resolved: 2024-05-15

## 2024-05-15 PROBLEM — M79.645 PAIN IN FINGER OF BOTH HANDS: Status: ACTIVE | Noted: 2024-05-15

## 2024-05-15 PROBLEM — R07.9 CHEST PAIN ON EXERTION: Status: ACTIVE | Noted: 2024-05-15

## 2024-05-15 PROBLEM — R09.81 NASAL CONGESTION: Status: ACTIVE | Noted: 2024-05-15

## 2024-05-15 PROBLEM — R51.9 HEADACHE: Status: ACTIVE | Noted: 2023-02-14

## 2024-05-15 PROBLEM — Z86.39 HISTORY OF THYROID DISORDER: Status: ACTIVE | Noted: 2024-05-15

## 2024-05-15 PROBLEM — J18.9 PNEUMONIA: Status: ACTIVE | Noted: 2024-05-15

## 2024-05-15 PROBLEM — R05.9 COUGH: Status: ACTIVE | Noted: 2024-05-15

## 2024-05-15 PROBLEM — Z90.710 HISTORY OF HYSTERECTOMY: Status: ACTIVE | Noted: 2024-05-15

## 2024-05-15 PROBLEM — H57.9 DISORDER OF EYE REGION: Status: ACTIVE | Noted: 2024-05-15

## 2024-05-15 PROBLEM — I65.23 ATHEROSCLEROSIS OF BOTH CAROTID ARTERIES: Status: ACTIVE | Noted: 2024-05-15

## 2024-05-15 PROCEDURE — G0444 DEPRESSION SCREEN ANNUAL: HCPCS | Performed by: FAMILY MEDICINE

## 2024-05-15 PROCEDURE — 1159F MED LIST DOCD IN RCRD: CPT | Performed by: FAMILY MEDICINE

## 2024-05-15 PROCEDURE — G0446 INTENS BEHAVE THER CARDIO DX: HCPCS | Performed by: FAMILY MEDICINE

## 2024-05-15 PROCEDURE — 1170F FXNL STATUS ASSESSED: CPT | Performed by: FAMILY MEDICINE

## 2024-05-15 PROCEDURE — 3074F SYST BP LT 130 MM HG: CPT | Performed by: FAMILY MEDICINE

## 2024-05-15 PROCEDURE — 3078F DIAST BP <80 MM HG: CPT | Performed by: FAMILY MEDICINE

## 2024-05-15 PROCEDURE — 1158F ADVNC CARE PLAN TLK DOCD: CPT | Performed by: FAMILY MEDICINE

## 2024-05-15 PROCEDURE — 1123F ACP DISCUSS/DSCN MKR DOCD: CPT | Performed by: FAMILY MEDICINE

## 2024-05-15 PROCEDURE — 1160F RVW MEDS BY RX/DR IN RCRD: CPT | Performed by: FAMILY MEDICINE

## 2024-05-15 PROCEDURE — 1036F TOBACCO NON-USER: CPT | Performed by: FAMILY MEDICINE

## 2024-05-15 PROCEDURE — G0439 PPPS, SUBSEQ VISIT: HCPCS | Performed by: FAMILY MEDICINE

## 2024-05-15 ASSESSMENT — ACTIVITIES OF DAILY LIVING (ADL)
DRESSING: INDEPENDENT
MANAGING_FINANCES: INDEPENDENT
BATHING: INDEPENDENT
TAKING_MEDICATION: INDEPENDENT
GROCERY_SHOPPING: INDEPENDENT
DOING_HOUSEWORK: INDEPENDENT

## 2024-05-15 ASSESSMENT — PATIENT HEALTH QUESTIONNAIRE - PHQ9
2. FEELING DOWN, DEPRESSED OR HOPELESS: NOT AT ALL
1. LITTLE INTEREST OR PLEASURE IN DOING THINGS: NOT AT ALL
SUM OF ALL RESPONSES TO PHQ9 QUESTIONS 1 AND 2: 0

## 2024-05-15 NOTE — PROGRESS NOTES
"Subjective   Reason for Visit: Mar Bob is an 78 y.o. female here for a Medicare Wellness visit.     Past Medical, Surgical, and Family History reviewed and updated in chart.    Reviewed all medications by prescribing practitioner or clinical pharmacist (such as prescriptions, OTCs, herbal therapies and supplements) and documented in the medical record.    HPI    Patient Care Team:  Belgica Hardy, DO as PCP - General     Doing well    Review of Systems  All systems reviewed and neg if not noted in the HPI above       Objective   Vitals:  /64 (Patient Position: Sitting)   Pulse 77   Temp 36.1 °C (97 °F)   Resp 15   Ht 1.6 m (5' 3\")   Wt 60.9 kg (134 lb 3.2 oz)   SpO2 95%   BMI 23.77 kg/m²       Physical Exam  Pleasant  Eyes: conjunctiva non-icteric and eye lids are without obvious rash or drooping. Pupils are symmetric.   Ears, Nose, Mouth, and Throat: External ears and nose appear to be without deformity or rash. No lesions or masses noted. Hearing is grossly intact.   CV: RRR, no murmur  Carotids: no bruits  Pulm:CTA B/L  Abd: soft, NTTP, + BS  LE: no edema  Psychiatric: Alert, orientation to person, place, and time. Recent/remote memory as evidenced through face-to-face interaction and discussion appear grossly intact. Mood and affect are normal.        Assessment/Plan   Problem List Items Addressed This Visit       Affective psychosis (CMS-HCC)    Current Assessment & Plan     - Stable  - Continue to monitor           Chronic kidney disease, stage 3a (Multi)    Current Assessment & Plan     Gfr56- improved  - Stable  - Continue to monitor           Relevant Orders    Basic metabolic panel    TSH    Lipid Panel    Hypothyroidism    Relevant Orders    Basic metabolic panel    TSH    Lipid Panel    Carotid bruit    Pulmonary emphysema (Multi)     Other Visit Diagnoses       Encounter for Medicare annual wellness exam    -  Primary    Relevant Orders    Basic metabolic panel    TSH    Lipid Panel "    Encounter for screening for other disorder                           Depression Screening                 Depression screening completed using the PHQ-2 questions with results documented in the chart/encounter (15min)                 (See Rooming Screening section for documentation, and/or progress note for additional information)         Cardiac Risk Assessment         -------------Current ASCVD risk score: 24.9%                 Cardiovascular risk was discussed and, if needed, lifestyle modifications recommended, including nutritional choices, exercise, and elimination of habits contributing to risk.                  We agreed on a plan to reduce the current cardiovascular risk based on above discussion as needed.                     Aspirin use/disuse was discussed and documented in the Problem List of the medical record (under Cardiac Risk Counseling) after reviewing the updated guidelines below:                 Consider low dose Aspirin ( mg) use if the benefit for cardiovascular disease prevention outweighs risk for bleeding complications.                  In general, low dose ASA should be considered:                 In patients WITHOUT prior MI/stroke/PAD (primary prevention):                  a.          Age <60: Use if 10-year cardiovascular disease risk >20%, with discussion of risks and benefits with patient                 b.          Age 60-<70: Use if 10-year cardiovascular disease risk >20% and low bleeding (e.g., gastrointestinal) risk, with discussion of risks and benefits with patient                 c.          Age >=70: Do not use                    In patients WITH prior MI/stroke/PAD (secondary prevention):                  Generally use unless extremely high bleeding (e.g., gastrointestinal) risk, with discussion of risks and benefits with patient             Please follow up in 6months for HTN and  1 yr for medicare wellness or as needed.

## 2024-05-15 NOTE — PATIENT INSTRUCTIONS
Continue to work on healthy diet and exercise  We encourage all patients to engage in exercise 150 minutes per week   Adults 18 and older, activity during each week - if you are able:    Engage in at least 150 minutes of moderate or vigorous exercise per week   If you are able- Engage in muscle strengthening activity on 2 or more days per week    Continue with ophtho    Labs today    Ok to follow up with pulm for your emphysema    Continue with Dr. Fowler        Please follow up in 6months for HTN and  1 yr for medicare wellness or as needed.       ** If labs or imaging ordered at today's visit, all the non-urgent results will be discussed at your next visit    If you have been referred for a special test or to a specialist please call  [phone] to schedule an appointment.  If you have any further questions, or if develop new or worsened symptoms, please give our office a call at [phone].

## 2024-05-20 DIAGNOSIS — I10 HYPERTENSION, UNSPECIFIED TYPE: ICD-10-CM

## 2024-05-20 DIAGNOSIS — K21.9 GASTROESOPHAGEAL REFLUX DISEASE, UNSPECIFIED WHETHER ESOPHAGITIS PRESENT: ICD-10-CM

## 2024-05-21 RX ORDER — LISINOPRIL 10 MG/1
10 TABLET ORAL DAILY
Qty: 90 TABLET | Refills: 3 | Status: SHIPPED | OUTPATIENT
Start: 2024-05-21

## 2024-05-21 RX ORDER — OMEPRAZOLE 20 MG/1
CAPSULE, DELAYED RELEASE ORAL
Qty: 90 CAPSULE | Refills: 0 | Status: SHIPPED | OUTPATIENT
Start: 2024-05-21

## 2024-05-30 DIAGNOSIS — I65.23 ATHEROSCLEROSIS OF BOTH CAROTID ARTERIES: ICD-10-CM

## 2024-05-30 DIAGNOSIS — R09.89 BRUIT OF LEFT CAROTID ARTERY: ICD-10-CM

## 2024-06-03 RX ORDER — ASPIRIN 81 MG/1
81 TABLET ORAL DAILY
Qty: 90 TABLET | Refills: 3 | Status: SHIPPED | OUTPATIENT
Start: 2024-06-03

## 2024-06-13 ENCOUNTER — LAB (OUTPATIENT)
Dept: LAB | Facility: LAB | Age: 79
End: 2024-06-13
Payer: MEDICARE

## 2024-06-13 DIAGNOSIS — E03.9 HYPOTHYROIDISM, UNSPECIFIED TYPE: ICD-10-CM

## 2024-06-13 DIAGNOSIS — N18.31 CHRONIC KIDNEY DISEASE, STAGE 3A (MULTI): ICD-10-CM

## 2024-06-13 DIAGNOSIS — Z00.00 ENCOUNTER FOR MEDICARE ANNUAL WELLNESS EXAM: ICD-10-CM

## 2024-06-13 LAB
ANION GAP SERPL CALC-SCNC: 12 MMOL/L (ref 10–20)
BUN SERPL-MCNC: 14 MG/DL (ref 6–23)
CALCIUM SERPL-MCNC: 9 MG/DL (ref 8.6–10.6)
CHLORIDE SERPL-SCNC: 106 MMOL/L (ref 98–107)
CHOLEST SERPL-MCNC: 183 MG/DL (ref 0–199)
CHOLESTEROL/HDL RATIO: 4.9
CO2 SERPL-SCNC: 27 MMOL/L (ref 21–32)
CREAT SERPL-MCNC: 0.93 MG/DL (ref 0.5–1.05)
EGFRCR SERPLBLD CKD-EPI 2021: 63 ML/MIN/1.73M*2
GLUCOSE SERPL-MCNC: 92 MG/DL (ref 74–99)
HDLC SERPL-MCNC: 37.3 MG/DL
LDLC SERPL CALC-MCNC: 74 MG/DL
NON HDL CHOLESTEROL: 146 MG/DL (ref 0–149)
POTASSIUM SERPL-SCNC: 3.7 MMOL/L (ref 3.5–5.3)
SODIUM SERPL-SCNC: 141 MMOL/L (ref 136–145)
TRIGL SERPL-MCNC: 361 MG/DL (ref 0–149)
TSH SERPL-ACNC: 19.35 MIU/L (ref 0.44–3.98)
VLDL: 72 MG/DL (ref 0–40)

## 2024-06-13 PROCEDURE — 36415 COLL VENOUS BLD VENIPUNCTURE: CPT

## 2024-06-13 PROCEDURE — 84443 ASSAY THYROID STIM HORMONE: CPT

## 2024-06-13 PROCEDURE — 80061 LIPID PANEL: CPT

## 2024-06-13 PROCEDURE — 80048 BASIC METABOLIC PNL TOTAL CA: CPT

## 2024-06-17 ENCOUNTER — APPOINTMENT (OUTPATIENT)
Dept: BEHAVIORAL HEALTH | Facility: CLINIC | Age: 79
End: 2024-06-17
Payer: MEDICARE

## 2024-06-17 DIAGNOSIS — F39 AFFECTIVE PSYCHOSIS (CMS-HCC): ICD-10-CM

## 2024-06-17 DIAGNOSIS — F31.78 BIPOLAR DISORDER, IN FULL REMISSION, MOST RECENT EPISODE MIXED (MULTI): ICD-10-CM

## 2024-06-17 PROCEDURE — 1160F RVW MEDS BY RX/DR IN RCRD: CPT | Performed by: PSYCHIATRY & NEUROLOGY

## 2024-06-17 PROCEDURE — 1123F ACP DISCUSS/DSCN MKR DOCD: CPT | Performed by: PSYCHIATRY & NEUROLOGY

## 2024-06-17 PROCEDURE — 99214 OFFICE O/P EST MOD 30 MIN: CPT | Performed by: PSYCHIATRY & NEUROLOGY

## 2024-06-17 PROCEDURE — 1159F MED LIST DOCD IN RCRD: CPT | Performed by: PSYCHIATRY & NEUROLOGY

## 2024-06-17 RX ORDER — QUETIAPINE FUMARATE 100 MG/1
100 TABLET, FILM COATED ORAL DAILY
Qty: 90 TABLET | Refills: 1 | Status: SHIPPED | OUTPATIENT
Start: 2024-06-17 | End: 2024-12-14

## 2024-07-16 ENCOUNTER — APPOINTMENT (OUTPATIENT)
Dept: DERMATOLOGY | Facility: CLINIC | Age: 79
End: 2024-07-16
Payer: MEDICARE

## 2024-07-16 DIAGNOSIS — D22.9 MULTIPLE BENIGN NEVI: Primary | ICD-10-CM

## 2024-07-16 DIAGNOSIS — L82.0 INFLAMED SEBORRHEIC KERATOSIS: ICD-10-CM

## 2024-07-16 DIAGNOSIS — L82.1 SEBORRHEIC KERATOSIS: ICD-10-CM

## 2024-07-16 DIAGNOSIS — Z12.83 SCREENING EXAM FOR SKIN CANCER: ICD-10-CM

## 2024-07-16 DIAGNOSIS — L81.4 LENTIGO: ICD-10-CM

## 2024-07-16 DIAGNOSIS — L57.8 ACTINIC SKIN DAMAGE: ICD-10-CM

## 2024-07-16 DIAGNOSIS — Z85.828 PERSONAL HISTORY OF SKIN CANCER: ICD-10-CM

## 2024-07-16 PROBLEM — D18.01 HEMANGIOMA OF SKIN AND SUBCUTANEOUS TISSUE: Status: RESOLVED | Noted: 2023-07-12 | Resolved: 2024-07-16

## 2024-07-16 PROBLEM — D22.5 MELANOCYTIC NEVI OF TRUNK: Status: RESOLVED | Noted: 2023-07-12 | Resolved: 2024-07-16

## 2024-07-16 PROBLEM — L90.5 SCAR CONDITION AND FIBROSIS OF SKIN: Status: RESOLVED | Noted: 2023-07-12 | Resolved: 2024-07-16

## 2024-07-16 PROBLEM — L91.8 OTHER HYPERTROPHIC DISORDERS OF THE SKIN: Status: RESOLVED | Noted: 2023-07-12 | Resolved: 2024-07-16

## 2024-07-16 PROBLEM — L53.9 ERYTHEMA: Status: RESOLVED | Noted: 2023-09-20 | Resolved: 2024-07-16

## 2024-07-16 PROBLEM — C44.319 BASAL CELL CARCINOMA OF SKIN OF OTHER PARTS OF FACE: Status: RESOLVED | Noted: 2023-07-12 | Resolved: 2024-07-16

## 2024-07-16 PROBLEM — L30.9 DERMATITIS: Status: RESOLVED | Noted: 2023-09-20 | Resolved: 2024-07-16

## 2024-07-16 PROBLEM — D22.62 MELANOCYTIC NEVI OF LEFT UPPER LIMB, INCLUDING SHOULDER: Status: RESOLVED | Noted: 2023-07-12 | Resolved: 2024-07-16

## 2024-07-16 PROBLEM — D48.5 NEOPLASM OF UNCERTAIN BEHAVIOR OF SKIN: Status: RESOLVED | Noted: 2023-07-12 | Resolved: 2024-07-16

## 2024-07-16 PROCEDURE — 1123F ACP DISCUSS/DSCN MKR DOCD: CPT | Performed by: DERMATOLOGY

## 2024-07-16 PROCEDURE — 1159F MED LIST DOCD IN RCRD: CPT | Performed by: DERMATOLOGY

## 2024-07-16 PROCEDURE — 99213 OFFICE O/P EST LOW 20 MIN: CPT | Performed by: DERMATOLOGY

## 2024-07-16 PROCEDURE — 17110 DESTRUCTION B9 LES UP TO 14: CPT | Performed by: DERMATOLOGY

## 2024-07-16 PROCEDURE — 1160F RVW MEDS BY RX/DR IN RCRD: CPT | Performed by: DERMATOLOGY

## 2024-07-16 ASSESSMENT — DERMATOLOGY QUALITY OF LIFE (QOL) ASSESSMENT
ARE THERE EXCLUSIONS OR EXCEPTIONS FOR THE QUALITY OF LIFE ASSESSMENT: NO
RATE HOW EMOTIONALLY BOTHERED YOU ARE BY YOUR SKIN PROBLEM (FOR EXAMPLE, WORRY, EMBARRASSMENT, FRUSTRATION): 0 - NEVER BOTHERED
RATE HOW BOTHERED YOU ARE BY SYMPTOMS OF YOUR SKIN PROBLEM (EG, ITCHING, STINGING BURNING, HURTING OR SKIN IRRITATION): 0 - NEVER BOTHERED
RATE HOW BOTHERED YOU ARE BY EFFECTS OF YOUR SKIN PROBLEMS ON YOUR ACTIVITIES (EG, GOING OUT, ACCOMPLISHING WHAT YOU WANT, WORK ACTIVITIES OR YOUR RELATIONSHIPS WITH OTHERS): 0 - NEVER BOTHERED
DATE THE QUALITY-OF-LIFE ASSESSMENT WAS COMPLETED: 67037

## 2024-07-16 ASSESSMENT — DERMATOLOGY PATIENT ASSESSMENT
HAVE YOU HAD OR DO YOU HAVE VASCULAR DISEASE: NO
DO YOU HAVE IRREGULAR MENSTRUAL CYCLES: NO
DO YOU USE A TANNING BED: NO
ARE YOU ON BIRTH CONTROL: NO
DO YOU USE SUNSCREEN: OCCASIONALLY
ARE YOU AN ORGAN TRANSPLANT RECIPIENT: NO
HAVE YOU HAD OR DO YOU HAVE A STAPH INFECTION: NO
ARE YOU TRYING TO GET PREGNANT: NO
DO YOU HAVE ANY NEW OR CHANGING LESIONS: NO

## 2024-07-16 ASSESSMENT — ITCH NUMERIC RATING SCALE: HOW SEVERE IS YOUR ITCHING?: 0

## 2024-07-16 ASSESSMENT — PATIENT GLOBAL ASSESSMENT (PGA): PATIENT GLOBAL ASSESSMENT: PATIENT GLOBAL ASSESSMENT:  1 - CLEAR

## 2024-07-16 NOTE — PROGRESS NOTES
Telma Bob is a 78 y.o. female who presents for the following: Skin Check.     Last derm visit 7/12/23 for Full Skin Exam - no lesions of concern      Review of Systems:  No other skin or systemic complaints other than what is documented elsewhere in the note.    The following portions of the chart were reviewed this encounter and updated as appropriate:  Tobacco  Allergies  Meds  Problems  Med Hx  Surg Hx         Skin Cancer History  No skin cancer on file.      Specialty Problems          Dermatology Problems    Melanocytic nevus    Alopecia    History of skin cancer     History of basal cell carcinoma of left temple s/p Mohs with Dr. Rowley 10/11/19  History of basal cell carcinoma of right back s/p excision 2002         Allergic contact dermatitis, unspecified cause    Inflamed seborrheic keratosis    Nonscarring hair loss, unspecified        Objective   Well appearing patient in no apparent distress; mood and affect are within normal limits.    A full examination was performed including scalp, head, eyes, ears, nose, lips, neck, chest, axillae, abdomen, back, buttocks, bilateral upper extremities, bilateral lower extremities, hands, feet, fingers, toes, fingernails, and toenails. All findings within normal limits unless otherwise noted below.    Assessment/Plan   1. Multiple benign nevi  Brown and tan macules and papules with reassuring findings on dermoscopy    -These lesions have benign, reassuring patterns on dermoscopy  -Recommend continued self observation, and to contact the office if any changes in nevi are noticed    2. Lentigo  Tan macules    -Benign appearing on exam  -Reassurance, recommend observation    3. Seborrheic keratosis  Stuck on, waxy macule(s)/papule(s)/plaque(s) with comedo-like openings and milia like cysts    -Discussed the nature of the diagnosis  -Reassurance, recommend continued observation    4. Actinic skin damage  Background of photodamage with hyper- and  hypo-pigmented macules on the skin    5. Personal history of skin cancer    Personal History of Non-Melanoma Skin Cancer  -Well healed scar(s) with no evidence of recurrence  -Discussed the need for annual or semi-annual skin examinations and to return sooner if any new or changing lesions are noticed. Patient verbalizes understanding    6. Screening exam for skin cancer  As part of a routine Full Skin Exam, a genital examination with the presence of a chaperone was offered. The patient agreed to the exam and declined the chaperone.       Full body skin exam  -No lesions concerning for malignancy on the remainder the skin exam today   - The ugly duckling sign was discussed. Monitor for any skin lesions that are different in color, shape, or size than others on body  -Sun protection was discussed. Recommend SPF 30+, hats with brims, sun protective clothing, and avoiding sun exposure between 10 AM and 2 PM whenever possible  -Recommend regular skin exams or sooner if new or changing lesions       Related Procedures  Follow Up In Dermatology - Established Patient    7. Inflamed seborrheic keratosis (2)  Neck - Anterior (2)  Stuck-on, waxy macule(s)/papule(s)/plaque(s) with comedo-like openings and milia-like cysts with surrounding erythema and crusting    -Patient requests cryotherapy today for these clinically inflamed lesions  -Possible side effects of liquid nitrogen treatment reviewed including formation of blisters, crusting, tenderness, scar, and discoloration which may be permanent.    Destr of lesion - Neck - Anterior (2)  Complexity: simple    Destruction method: cryotherapy    Informed consent: discussed and consent obtained    Lesion destroyed using liquid nitrogen: Yes    Outcome: patient tolerated procedure well with no complications          Follow up 1 year Full Skin Exam

## 2024-08-21 DIAGNOSIS — F39 AFFECTIVE PSYCHOSIS (CMS-HCC): ICD-10-CM

## 2024-08-21 RX ORDER — QUETIAPINE FUMARATE 100 MG/1
100 TABLET, FILM COATED ORAL DAILY
Qty: 90 TABLET | Refills: 0 | Status: SHIPPED | OUTPATIENT
Start: 2024-08-21

## 2024-09-19 ENCOUNTER — APPOINTMENT (OUTPATIENT)
Dept: OPHTHALMOLOGY | Facility: CLINIC | Age: 79
End: 2024-09-19
Payer: MEDICARE

## 2024-09-19 DIAGNOSIS — H40.033 ANATOMICAL NARROW ANGLE OF BOTH EYES: ICD-10-CM

## 2024-09-19 DIAGNOSIS — H04.123 DRY EYE SYNDROME OF LACRIMAL GLAND, BILATERAL: ICD-10-CM

## 2024-09-19 DIAGNOSIS — Z96.1 PSEUDOPHAKIA OF BOTH EYES: ICD-10-CM

## 2024-09-19 DIAGNOSIS — H35.362 MACULAR DRUSEN, LEFT: Primary | ICD-10-CM

## 2024-09-19 DIAGNOSIS — H52.4 MYOPIA OF BOTH EYES WITH ASTIGMATISM AND PRESBYOPIA: ICD-10-CM

## 2024-09-19 DIAGNOSIS — H53.001 AMBLYOPIA OF RIGHT EYE: ICD-10-CM

## 2024-09-19 DIAGNOSIS — H52.203 MYOPIA OF BOTH EYES WITH ASTIGMATISM AND PRESBYOPIA: ICD-10-CM

## 2024-09-19 DIAGNOSIS — H35.372 EPIRETINAL MEMBRANE, LEFT EYE: ICD-10-CM

## 2024-09-19 DIAGNOSIS — H52.13 MYOPIA OF BOTH EYES WITH ASTIGMATISM AND PRESBYOPIA: ICD-10-CM

## 2024-09-19 PROBLEM — H35.3191 EARLY DRY STAGE NONEXUDATIVE AGE-RELATED MACULAR DEGENERATION: Status: ACTIVE | Noted: 2024-09-19

## 2024-09-19 PROBLEM — H40.9 GLAUCOMA: Status: RESOLVED | Noted: 2023-02-14 | Resolved: 2024-09-19

## 2024-09-19 PROBLEM — Z98.41 STATUS POST RIGHT CATARACT EXTRACTION: Status: RESOLVED | Noted: 2023-02-14 | Resolved: 2024-09-19

## 2024-09-19 PROBLEM — H57.9 DISORDER OF EYE REGION: Status: RESOLVED | Noted: 2024-05-15 | Resolved: 2024-09-19

## 2024-09-19 ASSESSMENT — REFRACTION_WEARINGRX
OS_ADD: +2.50
OD_AXIS: 095
OD_CYLINDER: -0.50
OD_SPHERE: -0.25
OD_ADD: +2.50
OS_SPHERE: -0.50
OS_CYLINDER: -0.25
OS_AXIS: 070

## 2024-09-19 ASSESSMENT — VISUAL ACUITY
OD_SC: 20/60+1
METHOD: SNELLEN - LINEAR
OS_SC: 20/30+2
OS_SC: 20/30+2
OD_SC: 20/100
OS_PH_SC: 20/25-2

## 2024-09-19 ASSESSMENT — REFRACTION_MANIFEST
OS_CYLINDER: -0.25
OD_AXIS: 135
OD_SPHERE: -0.25
OS_SPHERE: -0.25
OD_AXIS: 120
OS_AXIS: 100
OS_AXIS: 060
OD_ADD: +2.50
OS_CYLINDER: -0.25
OD_CYLINDER: -0.50
OD_SPHERE: -0.25
METHOD_AUTOREFRACTION: 1
OS_SPHERE: -0.25
OD_CYLINDER: -0.50
OS_ADD: +2.50

## 2024-09-19 ASSESSMENT — ENCOUNTER SYMPTOMS
NEUROLOGICAL NEGATIVE: 0
CARDIOVASCULAR NEGATIVE: 0
RESPIRATORY NEGATIVE: 0
EYES NEGATIVE: 1
CONSTITUTIONAL NEGATIVE: 0
GASTROINTESTINAL NEGATIVE: 0
HEMATOLOGIC/LYMPHATIC NEGATIVE: 0
PSYCHIATRIC NEGATIVE: 0
MUSCULOSKELETAL NEGATIVE: 0
ALLERGIC/IMMUNOLOGIC NEGATIVE: 0
ENDOCRINE NEGATIVE: 0

## 2024-09-19 ASSESSMENT — CONF VISUAL FIELD
OS_SUPERIOR_NASAL_RESTRICTION: 0
METHOD: COUNTING FINGERS
OD_SUPERIOR_TEMPORAL_RESTRICTION: 0
OS_SUPERIOR_TEMPORAL_RESTRICTION: 0
OD_INFERIOR_TEMPORAL_RESTRICTION: 0
OD_INFERIOR_NASAL_RESTRICTION: 0
OS_NORMAL: 1
OS_INFERIOR_TEMPORAL_RESTRICTION: 0
OD_SUPERIOR_NASAL_RESTRICTION: 0
OD_NORMAL: 1
OS_INFERIOR_NASAL_RESTRICTION: 0

## 2024-09-19 ASSESSMENT — CUP TO DISC RATIO: OS_RATIO: .3

## 2024-09-19 ASSESSMENT — PACHYMETRY
OD_CT(UM): 601
OS_CT(UM): 598

## 2024-09-19 ASSESSMENT — TONOMETRY
OD_IOP_MMHG: 14
IOP_METHOD: GOLDMANN APPLANATION
OS_IOP_MMHG: 14

## 2024-09-19 ASSESSMENT — EXTERNAL EXAM - RIGHT EYE: OD_EXAM: NORMAL

## 2024-09-19 ASSESSMENT — SLIT LAMP EXAM - LIDS
COMMENTS: GOOD POSITION
COMMENTS: GOOD POSITION

## 2024-09-19 ASSESSMENT — EXTERNAL EXAM - LEFT EYE: OS_EXAM: NORMAL

## 2024-09-19 NOTE — PROGRESS NOTES
Assessment/Plan   Diagnoses and all orders for this visit:  Macular Drusen Left Eye  Few isolated drusen.   (-)smoking  Discussed imporantance of sun protection outdoors, not smoking, healthy diet and exercise. RTC with any changed in vision.  Very mild findings on DFE and OCT MAC-monitor annually    Anatomical narrow angle of both eyes  History of LPI OU with Dr. Gonzalez at [facility]  LPI not patent OU  IOP 14 OD 14 OS c PACHS 601/598  ONH appearance with healthy NRR  Patient is now s/p PCIOL with open angles on VH estimation  Patient had appt with Dr. Reyes 3/11/24 and was cleared for general eyecare monitoring     Pseudophakia of both eyes  Myopia of both eyes with astigmatism and presbyopia  Amblyopia of right eye  Patient reports longstanding poor vision OD since childhood  BCVA OD 20/50 on exam today; last exam ~20/40  Prior to cataract sx reviewing notes from [facility] patient had an anisometropic spherical component present right eye. BCVA in 2016 recorded at 20/50 right eye  Updated spec RX today-patient having difficulty with narrow field of view in PAL but does not wish to switch between distance and near specs-discussed that she can switch to a bifocal if desired     Dry eye syndrome of lacrimal gland, bilateral  Continue ATs OU PRN.     Epiretinal membrane, left eye  Tr ERM. Monitor. Noted on DFE and MAC OCT-non visually significant    RTC 1 year for annual with DFE, MRX, OCT MAC

## 2024-09-20 PROBLEM — H35.362 MACULAR DRUSEN, LEFT: Status: ACTIVE | Noted: 2024-09-20

## 2024-09-20 PROBLEM — H52.203 MYOPIA OF BOTH EYES WITH ASTIGMATISM AND PRESBYOPIA: Status: ACTIVE | Noted: 2024-09-20

## 2024-09-20 PROBLEM — H52.4 MYOPIA OF BOTH EYES WITH ASTIGMATISM AND PRESBYOPIA: Status: ACTIVE | Noted: 2024-09-20

## 2024-09-20 PROBLEM — H35.372 EPIRETINAL MEMBRANE, LEFT EYE: Status: ACTIVE | Noted: 2024-09-20

## 2024-09-20 PROBLEM — H52.13 MYOPIA OF BOTH EYES WITH ASTIGMATISM AND PRESBYOPIA: Status: ACTIVE | Noted: 2024-09-20

## 2024-09-20 PROBLEM — H35.3191 EARLY DRY STAGE NONEXUDATIVE AGE-RELATED MACULAR DEGENERATION: Status: RESOLVED | Noted: 2024-09-19 | Resolved: 2024-09-20

## 2024-09-20 ASSESSMENT — CUP TO DISC RATIO: OD_RATIO: .15

## 2024-10-23 ENCOUNTER — LAB (OUTPATIENT)
Dept: LAB | Facility: LAB | Age: 79
End: 2024-10-23
Payer: MEDICARE

## 2024-10-23 DIAGNOSIS — E03.9 HYPOTHYROIDISM, UNSPECIFIED TYPE: ICD-10-CM

## 2024-10-23 DIAGNOSIS — E78.5 HYPERLIPIDEMIA, UNSPECIFIED HYPERLIPIDEMIA TYPE: ICD-10-CM

## 2024-10-23 LAB
CHOLEST SERPL-MCNC: 163 MG/DL (ref 0–199)
CHOLESTEROL/HDL RATIO: 3.4
HDLC SERPL-MCNC: 47.5 MG/DL
LDLC SERPL CALC-MCNC: 87 MG/DL
NON HDL CHOLESTEROL: 116 MG/DL (ref 0–149)
TRIGL SERPL-MCNC: 145 MG/DL (ref 0–149)
TSH SERPL-ACNC: 17.01 MIU/L (ref 0.44–3.98)
VLDL: 29 MG/DL (ref 0–40)

## 2024-10-23 PROCEDURE — 80061 LIPID PANEL: CPT

## 2024-10-23 PROCEDURE — 36415 COLL VENOUS BLD VENIPUNCTURE: CPT

## 2024-10-23 PROCEDURE — 84443 ASSAY THYROID STIM HORMONE: CPT

## 2024-11-12 DIAGNOSIS — E03.9 HYPOTHYROIDISM, UNSPECIFIED TYPE: ICD-10-CM

## 2024-11-12 RX ORDER — LEVOTHYROXINE SODIUM 137 UG/1
137 TABLET ORAL DAILY
Qty: 90 TABLET | Refills: 0 | Status: SHIPPED | OUTPATIENT
Start: 2024-11-12 | End: 2025-02-10

## 2024-12-02 ENCOUNTER — APPOINTMENT (OUTPATIENT)
Dept: BEHAVIORAL HEALTH | Facility: CLINIC | Age: 79
End: 2024-12-02
Payer: MEDICARE

## 2024-12-02 DIAGNOSIS — F39 AFFECTIVE PSYCHOSIS (CMS-HCC): ICD-10-CM

## 2024-12-02 PROCEDURE — 99214 OFFICE O/P EST MOD 30 MIN: CPT | Performed by: PSYCHIATRY & NEUROLOGY

## 2024-12-02 PROCEDURE — 1123F ACP DISCUSS/DSCN MKR DOCD: CPT | Performed by: PSYCHIATRY & NEUROLOGY

## 2024-12-02 PROCEDURE — 1160F RVW MEDS BY RX/DR IN RCRD: CPT | Performed by: PSYCHIATRY & NEUROLOGY

## 2024-12-02 PROCEDURE — 1159F MED LIST DOCD IN RCRD: CPT | Performed by: PSYCHIATRY & NEUROLOGY

## 2024-12-02 RX ORDER — QUETIAPINE FUMARATE 100 MG/1
100 TABLET, FILM COATED ORAL DAILY
Qty: 90 TABLET | Refills: 1 | Status: SHIPPED | OUTPATIENT
Start: 2024-12-02

## 2024-12-02 NOTE — PROGRESS NOTES
Subjective   Patient ID: Mar Bob is a 79 y.o. female who presents for No chief complaint on file..    Virtual Consent: The patient engaged in a telehealth session via Epic audio visual or phone with this provider practicing within the [facility]. The identity of the patient was verified by their date of birth and last four digits of their social security number. The provider demonstrated that confidentially was preserved at their location. The patient was informed that they were responsible for ensuring confidentially was secured at their location. The patient's location was documented for emergency purposes. The patient was informed of the necessary steps that would occur if an emergency was to occur or technology failed during session.   An interactive audio and video telecommunication system which permits real time communications between the patient (at the originating site at home) and provider (at the distant site home office) was utilized to provide this telehealth service.   Verbal consent was requested and obtained from Mar Bob on this date, 12/02/24 for a telehealth visit.     HPI: The patient reports doing well. She is benefiting from her medication and tolerating it well    Past Medical History:  01/30/2019: Acquired absence of both cervix and uterus      Comment:  H/O of hysterectomy with bilateral oophorectomy  08/09/2021: Chronic kidney disease, stage 3a (Multi)      Comment:  Chronic kidney disease, stage 3a  05/11/2021: COVID-19      Comment:  COVID-19  No date: Cystocele, unspecified (CODE)      Comment:  Prolapse of vaginal walls without uterine prolapse  12/01/2018: Cystocele, unspecified (CODE)      Comment:  Vaginal wall prolapse without uterine prolapse  07/09/2019: Disorder of the skin and subcutaneous tissue, unspecified      Comment:  Skin lesion of face  03/21/2019: Encounter for follow-up examination after completed   treatment for conditions other than malignant  neoplasm      Comment:  Postop check  10/11/2020: Encounter for other screening for malignant neoplasm of   breast      Comment:  Breast screening  10/15/2021: Encounter for other specified special examinations      Comment:  Encounter for geriatric assessment  No date: Gastro-esophageal reflux disease without esophagitis      Comment:  Gastroesophageal reflux disease without esophagitis  07/29/2020: Hypoxemia      Comment:  Hypoxia  No date: Melanocytic nevi, unspecified      Comment:  Melanocytic nevus  04/27/2017: Myalgia, other site      Comment:  Pain of right deltoid  No date: Other abnormal and inconclusive findings on diagnostic   imaging of breast      Comment:  Abnormal mammography  07/28/2020: Other conditions influencing health status      Comment:  History of cough  05/11/2021: Other forms of dyspnea      Comment:  Dyspnea on effort  11/04/2020: Other general symptoms and signs      Comment:  Suspected angle-closure glaucoma  09/30/2021: Pelvic and perineal pain      Comment:  Female pelvic pain  07/26/2021: Personal history of diseases of the blood and blood-  forming organs and certain disorders involving the immune mechanism      Comment:  History of thrombocytosis  07/08/2020: Personal history of diseases of the skin and subcutaneous   tissue      Comment:  History of alopecia  10/25/2021: Personal history of diseases of the skin and subcutaneous   tissue      Comment:  History of skin ulcer  11/18/2020: Personal history of other diseases of the circulatory   system      Comment:  History of peripheral arterial disease  No date: Personal history of other diseases of the digestive system      Comment:  History of hiatal hernia  05/23/2017: Personal history of other diseases of the nervous system   and sense organs      Comment:  History of eye disorder  No date: Personal history of other endocrine, nutritional and   metabolic disease      Comment:  History of thyroid nodule  11/08/2019: Personal  history of other malignant neoplasm of skin      Comment:  History of basal cell carcinoma  07/24/2018: Personal history of other mental and behavioral disorders      Comment:  History of depression  01/30/2019: Personal history of other specified conditions      Comment:  History of dysuria  07/10/2020: Personal history of other specified conditions      Comment:  History of nasal congestion  05/11/2021: Personal history of other specified conditions      Comment:  History of exertional chest pain  07/08/2020: Personal history of other specified conditions      Comment:  History of dysuria  12/13/2018: Personal history of other specified conditions      Comment:  History of urinary incontinence  10/25/2021: Personal history of other specified conditions      Comment:  History of dysuria  02/10/2021: Personal history of pneumonia (recurrent)      Comment:  History of pneumonia  10/13/2021: Personal history of urinary (tract) infections      Comment:  History of urinary tract infection  11/25/2019: Personal history of urinary (tract) infections      Comment:  History of urinary tract infection  01/30/2019: Personal history of urinary (tract) infections      Comment:  History of urinary tract infection  02/10/2021: Shortness of breath      Comment:  SOB (shortness of breath) on exertion  03/21/2019: Stress incontinence (female) (male)      Comment:  Female stress incontinence  No date: Unspecified mood (affective) disorder (CMS-HCC)      Comment:  Affective psychosis  12/20/2021: Urinary tract infection, site not specified      Comment:  Recurrent UTI  10/25/2021: Urinary tract infection, site not specified      Comment:  Acute UTI  07/09/2019: Uterovaginal prolapse, unspecified      Comment:  Cystocele with uterine descensus    Review of patient's family history indicates:  Problem: Breast cancer      Relation: Sister          Name:               Age of Onset: 35    Active Problems  Problems    · Affective psychosis  (296.90) (F39)   · Affective psychosis   · Alopecia (704.00) (L65.9)   · Anxiety (300.00) (F41.9)   · Asymptomatic menopause (V49.81) (Z78.0)   · Atrophic vaginitis (627.3) (N95.2)   · 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)   · Chronic kidney disease, stage 3a (585.3) (N18.31)   · Chronic kidney disease, stage 3a   · Chronic pain of right knee (719.46,338.29) (M25.561,G89.29)   · Dermatitis (692.9) (L30.9)   · Encounter for screening for other disorder (V82.89) (Z13.89)   · Encounter for screening mammogram for breast cancer (V76.12) (Z12.31)   · Encounter for vaccination (V05.9) (Z23)   · Erythema (695.9) (L53.9)   · Glaucoma (365.9) (H40.9)   · Headache, chronic daily (784.0) (R51.9)   · History of skin cancer (V10.83) (Z85.828)   · HTN (hypertension) (401.9) (I10)   · Hyperlipidemia (272.4) (E78.5)   · Hypothyroidism (244.9) (E03.9)   · Medicare annual wellness visit, subsequent (V70.0) (Z00.00)   · Memory changes (780.93) (R41.3)   · Mood disorder (296.90) (F39)   · Osteoporosis (733.00) (M81.0)   · Pelvic floor weakness (618.89) (N81.89)   · Status post right cataract extraction (V45.61) (Z98.41)     Past Medical History  Problems    · History of Abnormal mammography (793.80) (R92.8)   · History of Acute UTI (599.0) (N39.0)   · Resolved Date: 28 Apr 2022   · History of Affective psychosis (296.90) (F39)   · History of Breast screening (V76.10) (Z12.39)   · Resolved Date: 26 Jul 2021   · History of Chronic kidney disease, stage 3a (585.3) (N18.31)   · Resolved Date: 28 Apr 2022   · Chronic kidney disease, stage 3a   · History of COVID-19 (079.89) (U07.1)   · Resolved Date: 26 Jul 2021   · History of Cystocele with uterine descensus (618.4) (N81.4)   · Resolved Date: 18 Jul 2019   · History of Dyspnea on effort (786.09) (R06.09)   · Resolved Date: 28 Apr 2022   · History of Encounter for geriatric assessment (V72.85) (Z01.89)   · Resolved Date: 28 Apr 2022   · History of Female pelvic pain (625.9) (R10.2)   ·  Resolved Date: 28 Apr 2022   · History of Female stress incontinence (625.6) (N39.3)   · Resolved Date: 18 Jul 2019   · History of Gastroesophageal reflux disease without esophagitis (530.81) (K21.9)   · History of H/O of hysterectomy with bilateral oophorectomy (V88.01,V45.77)  (Z90.710,Z90.722)   · Resolved Date: 26 Jul 2021   · History of alopecia (V13.89) (Z87.2)   · Resolved Date: 11 Oct 2020   · History of basal cell carcinoma (V10.83) (Z85.828)   · History of cough   · Resolved Date: 26 Jul 2021   · History of depression (V11.8) (Z86.59)   · Resolved Date: 26 Jul 2021   · History of dysuria (V13.00) (Z87.898)   · Resolved Date: 25 Nov 2019   · History of dysuria (V13.00) (Z87.898)   · Resolved Date: 26 Jul 2021   · History of dysuria (V13.00) (Z87.898)   · Resolved Date: 28 Apr 2022   · History of exertional chest pain (V13.89) (Z87.898)   · Resolved Date: 26 Jul 2021   · History of eye disorder (V12.49) (Z86.69)   · Resolved Date: 26 Jul 2021   · History of hiatal hernia (V12.79) (Z87.19)   · History of nasal congestion (V12.69) (Z87.898)   · Resolved Date: 26 Jul 2021   · History of peripheral arterial disease (V12.59) (Z86.79)   · Resolved Date: 20 Nov 2020   · History of pneumonia (V12.61) (Z87.01)   · Resolved Date: 28 Apr 2022   · History of skin ulcer (V13.3) (Z87.2)   · Resolved Date: 28 Apr 2022   · History of thrombocytosis (V12.3) (Z86.2)   · Resolved Date: 28 Apr 2022   · Thrombocytosis   · History of thyroid nodule (V12.29) (Z86.39)   · History of urinary incontinence (V13.09) (Z87.898)   · Resolved Date: 18 Jul 2019   · History of urinary tract infection (V13.02) (Z87.440)   · Resolved Date: 25 Nov 2019   · History of urinary tract infection (V13.02) (Z87.440)   · Resolved Date: 16 Jul 2020   · History of urinary tract infection (V13.02) (Z87.440)   · Resolved Date: 28 Apr 2022   · History of Hypoxia (799.02) (R09.02)   · Resolved Date: 26 Jul 2021   · History of Melanocytic nevus (216.9)  (D22.9)   · History of Pain of right deltoid (729.1) (M79.18)   · Resolved Date: 28 Apr 2022   · History of Postop check (V67.00) (Z09)   · Resolved Date: 18 Jul 2019   · History of Prolapse of vaginal walls without uterine prolapse (618.00) (N81.10)   · History of Recurrent UTI (599.0) (N39.0)   · Resolved Date: 28 Apr 2022   · History of Skin lesion of face (709.9) (L98.9)   · Resolved Date: 28 Apr 2022   · History of SOB (shortness of breath) on exertion (786.05) (R06.02)   · Resolved Date: 26 Jul 2021   · History of Suspected angle-closure glaucoma (780.99) (R68.89)   · History of Vaginal wall prolapse without uterine prolapse (618.00) (N81.10)   · Resolved Date: 14 Feb 2019     Past Psychiatric History   Onset History: Had a total breakdown in 1997.   Inpatient history: None   Suicide attempts/Self-Harm/Ideation History: None   Past providers: At [place] and [facility].   Past medication trials: Quetiapine, nortriptyline.      Surgical History  Problems    · History of Hysterectomy vaginal   · History of Iridectomy peripheral   · History of Left Breast Lumpectomy   · benign tumors   · History of Perineoplasty   · History of Salpingo-oophorectomy bilateral   · History of Tubal Ligation   · History of Uterosacral ligament suspension     Family History  Mother    · Family history of DM II (diabetes mellitus, type II), controlled   · Family history of cardiac disorder (V17.49) (Z82.49)   · Family history of malignant neoplasm of breast (V16.3) (Z80.3)  Father    · Family history of malignant neoplasm of urinary bladder (V16.52) (Z80.52)     Social History  Problems    · Born in [place]   · Completed elementary school   · Completed high school   · Daily caffeine consumption   · Education Level: Some college   · Education Level: Trade school   · Former smoker (V15.82) (Z87.891)   ·    · No alcohol use   · No illicit drug use   · Number of children   · Has one son and one daughter.   · Retired from  employment   · Various clerical jobs.  MSE: The patient is alert, fully oriented, language is intact, and recent and remote memory intact. The patient denies any suicidal or homicidal ideation or plans. The patient presents with no depressive, manic or psychotic symptoms. Thought is logical and clear. No disturbances of judgment or insight are exhibited. No behavioral disturbances are present on examination.        Review of Systems   Neurological:         MSE: The patient is alert, fully oriented, language is intact, and recent and remote memory intact. The patient denies any suicidal or homicidal ideation or plans. The patient presents with no depressive, manic or psychotic symptoms. Thought is logical and clear. No disturbances of judgment or insight are exhibited. No behavioral disturbances are present on examination.     Psychiatric/Behavioral:          MSE: The patient is alert, fully oriented, language is intact, and recent and remote memory intact. The patient denies any suicidal or homicidal ideation or plans. The patient presents with no depressive, manic or psychotic symptoms. Thought is logical and clear. No disturbances of judgment or insight are exhibited. No behavioral disturbances are present on examination.     All other systems reviewed and are negative.    Psych Review of Symptoms:    ADHD: Patient denied any symptoms.         Anxiety: Patient denied any symptoms.         Developmental and Sensory Concerns: Patient denied any symptoms.         Depressive Symptoms: Patient denied any symptoms.         Disruptive and Conduct Symptoms: Patient denied any symptoms.         Eating / Feeding Concerns: Patient denied any symptoms.         Elimination Symptoms: Patient denied any symptoms.         Manic Symptoms: Patient denied any symptoms.         Obsessive-Compulsive Symptoms: Patient denied any symptoms.         Psychotic Symptoms: Patient denied any symptoms.           Trauma Related Symptoms:  Patient denied any symptoms.           Sleep Concerns: Patient denied any symptoms.             Objective   Physical Exam  Constitutional:       Appearance: Normal appearance.   Neurological:      Mental Status: She is alert and oriented to person, place, and time.      Comments: MSE: The patient is alert, fully oriented, language is intact, and recent and remote memory intact. The patient denies any suicidal or homicidal ideation or plans. The patient presents with no depressive, manic or psychotic symptoms. Thought is logical and clear. No disturbances of judgment or insight are exhibited. No behavioral disturbances are present on examination.     Psychiatric:         Mood and Affect: Mood normal.         Behavior: Behavior normal.         Thought Content: Thought content normal.         Judgment: Judgment normal.      Comments: MSE: The patient is alert, fully oriented, language is intact, and recent and remote memory intact. The patient denies any suicidal or homicidal ideation or plans. The patient presents with no depressive, manic or psychotic symptoms. Thought is logical and clear. No disturbances of judgment or insight are exhibited. No behavioral disturbances are present on examination.           Lab Review:   Lab on 10/23/2024   Component Date Value    Thyroid Stimulating Horm* 10/23/2024 17.01 (H)     Cholesterol 10/23/2024 163     HDL-Cholesterol 10/23/2024 47.5     Cholesterol/HDL Ratio 10/23/2024 3.4     LDL Calculated 10/23/2024 87     VLDL 10/23/2024 29     Triglycerides 10/23/2024 145     Non HDL Cholesterol 10/23/2024 116    Lab on 06/13/2024   Component Date Value    Glucose 06/13/2024 92     Sodium 06/13/2024 141     Potassium 06/13/2024 3.7     Chloride 06/13/2024 106     Bicarbonate 06/13/2024 27     Anion Gap 06/13/2024 12     Urea Nitrogen 06/13/2024 14     Creatinine 06/13/2024 0.93     eGFR 06/13/2024 63     Calcium 06/13/2024 9.0     Thyroid Stimulating Horm* 06/13/2024 19.35 (H)      Cholesterol 06/13/2024 183     HDL-Cholesterol 06/13/2024 37.3     Cholesterol/HDL Ratio 06/13/2024 4.9     LDL Calculated 06/13/2024 74     VLDL 06/13/2024 72 (H)     Triglycerides 06/13/2024 361 (H)     Non HDL Cholesterol 06/13/2024 146        Assessment/Plan   Psychiatric Risk Assessment  Violence Risk Assessment: none  Acute Risk of Harm to Others is Considered: low   Suicide Risk Assessment: age > 65 yrs old and   Protective Factors against Suicide: adherence to  treatment, fear of suicide, moral objections to suicide, positive family relationships, and sense of responsibility toward family  Acute Risk of Harm to Self is Considered: low    Imminent Risk of Suicide or Serious Self-Injury: Low   Chronic Risk of Suicide of Serious Self-Injury: Low  Risk factors: Age, depression history and   Protective factors: Denies current suicidal ideation, denies history of suicide attempts , willingness to seek help and support , gender, access to a variety of clinical interventions , and receiving and engaged in care for mental, physical, and substance use disorders      Imminent Risk of Violence or Homicide: Low   Risk Factors: No significant risk factors identified on screening  Protective Factors: Lack of known history of harm to others , Lack of known history of violent ideation , and lack of known access to firearms.     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     Treatment recommendations:  The risks, benefits & alternatives to the medications prescribed were explained to you today. You were able to understand & repeat these risks, benefits & alternatives to this prescribed medication. You have agreed to proceed with treatment with the medications discussed based on the conclusion that the benefit outweighs the risks of this treatment regimen: continue quetiapine 100 mg  daily for treatment of  possible schizoaffecitive bipolar disorder.      Orders:    QUEtiapine (SEROquel) 100 mg tablet; Take 1 tablet (100 mg) by mouth once daily.    Follow Up In Psychiatry; Future    Follow up in May of 2025.  Time:   Prep time on date of the patient encounter: 6 minutes.   Time spent directly with patient/family/caregiver: 17 minutes.   Additional time spent on patient care activities: 0 minutes.   Documentation time: 7 minutes.   Total time on date of patient encounter: 30 minutes.

## 2024-12-02 NOTE — ASSESSMENT & PLAN NOTE
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     Treatment recommendations:  The risks, benefits & alternatives to the medications prescribed were explained to you today. You were able to understand & repeat these risks, benefits & alternatives to this prescribed medication. You have agreed to proceed with treatment with the medications discussed based on the conclusion that the benefit outweighs the risks of this treatment regimen: continue quetiapine 100 mg daily for treatment of  possible schizoaffecitive bipolar disorder.      Orders:    QUEtiapine (SEROquel) 100 mg tablet; Take 1 tablet (100 mg) by mouth once daily.    Follow Up In Psychiatry; Future

## 2024-12-09 ENCOUNTER — APPOINTMENT (OUTPATIENT)
Dept: BEHAVIORAL HEALTH | Facility: CLINIC | Age: 79
End: 2024-12-09
Payer: MEDICARE

## 2024-12-16 ENCOUNTER — APPOINTMENT (OUTPATIENT)
Dept: BEHAVIORAL HEALTH | Facility: CLINIC | Age: 79
End: 2024-12-16
Payer: MEDICARE

## 2025-01-10 DIAGNOSIS — E78.5 HYPERLIPIDEMIA, UNSPECIFIED HYPERLIPIDEMIA TYPE: ICD-10-CM

## 2025-01-13 RX ORDER — ATORVASTATIN CALCIUM 40 MG/1
40 TABLET, FILM COATED ORAL NIGHTLY
Qty: 90 TABLET | Refills: 1 | Status: SHIPPED | OUTPATIENT
Start: 2025-01-13

## 2025-01-29 NOTE — PROGRESS NOTES
Subjective   Patient ID: Mar Bob is a 78 y.o. female who presents for No chief complaint on file. I am doing     The patient engaged in a telehealth session via Epic audio visual or phone with this provider practicing within the [facility]. The identity of the patient was verified by their date of birth and last four digits of their social security number. The provider demonstrated that confidentially was preserved at their location. The patient was informed that they were responsible for ensuring confidentially was secured at their location. The patient's location was documented for emergency purposes. The patient was informed of the necessary steps that would occur if an emergency was to occur or technology failed during session.     HPI: The patient is concerned with more fat in her blood.     PMH: The patient reports a history of a break with reality with significant mood disturbance in 1997.     FH: The patient's sister has had similar mood disturbance and break with reality leading to a hospitalization. She was diagnosed with bipolar. One niece committed suicide. This niece is one of twins and both have suffered from mood disturbance. The paternal grandmother also had depression with fear possible psychosis. The acute illness happened after menopause with both the patient and her sister.     MSE: The patient is alert, fully oriented, language is intact, and recent and remote memory intact. The patient denies any suicidal or homicidal ideation or plans. The patient presents with no depressive, manic or psychotic symptoms. Thought is logical and clear. No disturbances of judgment or insight are exhibited. No behavioral disturbances are present on examination.          Review of Systems   Neurological:         The patient is alert, fully oriented, language is intact, and recent and remote memory intact. The patient denies any suicidal or homicidal ideation or plans. The patient presents with no  depressive, manic or psychotic symptoms. Thought is logical and clear. No disturbances of judgment or insight are exhibited. No behavioral disturbances are present on examination.       Psychiatric/Behavioral:          The patient is alert, fully oriented, language is intact, and recent and remote memory intact. The patient denies any suicidal or homicidal ideation or plans. The patient presents with no depressive, manic or psychotic symptoms. Thought is logical and clear. No disturbances of judgment or insight are exhibited. No behavioral disturbances are present on examination.       All other systems reviewed and are negative.      Objective   Physical Exam  Neurological:      General: No focal deficit present.      Mental Status: She is alert and oriented to person, place, and time. Mental status is at baseline.      Comments: The patient is alert, fully oriented, language is intact, and recent and remote memory intact. The patient denies any suicidal or homicidal ideation or plans. The patient presents with no depressive, manic or psychotic symptoms. Thought is logical and clear. No disturbances of judgment or insight are exhibited. No behavioral disturbances are present on examination.       Psychiatric:         Mood and Affect: Mood normal.         Behavior: Behavior normal.         Thought Content: Thought content normal.         Judgment: Judgment normal.      Comments: The patient is alert, fully oriented, language is intact, and recent and remote memory intact. The patient denies any suicidal or homicidal ideation or plans. The patient presents with no depressive, manic or psychotic symptoms. Thought is logical and clear. No disturbances of judgment or insight are exhibited. No behavioral disturbances are present on examination.             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    Treatment recommendations:  The risks, benefits & alternatives to the medications prescribed were explained to you today. You were able to understand & repeat these risks, benefits & alternatives to this prescribed medication. You have agreed to proceed with treatment with the medications discussed based on the conclusion that the benefit outweighs the risks of this treatment regimen: continue quetiapine 100 mg daily for treatment of  possible schizoaffecitive bipolar disorder.     Follow up in November of 2024 or sooner if needed.         Psych Review of Symptoms:    ADHD: Patient denied any symptoms.         Anxiety: Patient denied any symptoms.         Developmental and Sensory Concerns: Patient denied any symptoms.         Depressive Symptoms: Patient denied any symptoms.         Disruptive and Conduct Symptoms: Patient denied any symptoms.         Eating / Feeding Concerns: Patient denied any symptoms.         Elimination Symptoms: Patient denied any symptoms.         Manic Symptoms: Patient denied any symptoms.         Obsessive-Compulsive Symptoms: Patient denied any symptoms.         Psychotic Symptoms: Patient denied any symptoms.           Trauma Related Symptoms: Patient denied any symptoms.           Sleep Concerns: Patient denied any symptoms.            stretcher

## 2025-03-06 DIAGNOSIS — K21.9 GASTROESOPHAGEAL REFLUX DISEASE, UNSPECIFIED WHETHER ESOPHAGITIS PRESENT: ICD-10-CM

## 2025-03-11 DIAGNOSIS — E03.9 HYPOTHYROIDISM, UNSPECIFIED TYPE: ICD-10-CM

## 2025-03-11 LAB — TSH SERPL-ACNC: 2.65 MIU/L (ref 0.4–4.5)

## 2025-03-11 RX ORDER — LEVOTHYROXINE SODIUM 137 UG/1
137 TABLET ORAL DAILY
Qty: 90 TABLET | Refills: 3 | Status: SHIPPED | OUTPATIENT
Start: 2025-03-11 | End: 2026-03-11

## 2025-03-12 RX ORDER — OMEPRAZOLE 20 MG/1
CAPSULE, DELAYED RELEASE ORAL
Qty: 90 CAPSULE | Refills: 0 | Status: SHIPPED | OUTPATIENT
Start: 2025-03-12

## 2025-05-19 ENCOUNTER — APPOINTMENT (OUTPATIENT)
Dept: BEHAVIORAL HEALTH | Facility: CLINIC | Age: 80
End: 2025-05-19
Payer: MEDICARE

## 2025-05-19 DIAGNOSIS — F39 AFFECTIVE PSYCHOSIS: ICD-10-CM

## 2025-05-19 PROCEDURE — 1123F ACP DISCUSS/DSCN MKR DOCD: CPT | Performed by: PSYCHIATRY & NEUROLOGY

## 2025-05-19 PROCEDURE — 1160F RVW MEDS BY RX/DR IN RCRD: CPT | Performed by: PSYCHIATRY & NEUROLOGY

## 2025-05-19 PROCEDURE — 99214 OFFICE O/P EST MOD 30 MIN: CPT | Performed by: PSYCHIATRY & NEUROLOGY

## 2025-05-19 PROCEDURE — 1159F MED LIST DOCD IN RCRD: CPT | Performed by: PSYCHIATRY & NEUROLOGY

## 2025-05-19 RX ORDER — QUETIAPINE FUMARATE 100 MG/1
100 TABLET, FILM COATED ORAL NIGHTLY
Qty: 90 TABLET | Refills: 1 | Status: SHIPPED | OUTPATIENT
Start: 2025-05-19

## 2025-05-19 NOTE — PROGRESS NOTES
Subjective   Patient ID: Mar Bob is a 79 y.o. female who presents for No chief complaint on file. Things are going pretty good.     Virtual Consent: The patient engaged in a telehealth session via Epic audio visual or phone with this provider practicing within the [facility]. The identity of the patient was verified by their date of birth and last four digits of their social security number. The provider demonstrated that confidentially was preserved at their location. The patient was informed that they were responsible for ensuring confidentially was secured at their location. The patient's location was documented for emergency purposes. The patient was informed of the necessary steps that would occur if an emergency was to occur or technology failed during session.   An interactive audio and video telecommunication system which permits real time communications between the patient (at the originating site at home) and provider (at the distant site home office) was utilized to provide this telehealth service.   Verbal consent was requested and obtained from Mar Bob on this date, 5/19/25 for a telehealth visit.      HPI: The patient reports doing well. She is benefiting from her medication and tolerating it well. She reports that she feels the quetiapine has stabilized her moods, anxiety and helped her sleep. She reports no problem with the medication.     Past Medical History:  01/30/2019: Acquired absence of both cervix and uterus      Comment:  H/O of hysterectomy with bilateral oophorectomy  08/09/2021: Chronic kidney disease, stage 3a (Multi)      Comment:  Chronic kidney disease, stage 3a  05/11/2021: COVID-19      Comment:  COVID-19  No date: Cystocele, unspecified (CODE)      Comment:  Prolapse of vaginal walls without uterine prolapse  12/01/2018: Cystocele, unspecified (CODE)      Comment:  Vaginal wall prolapse without uterine prolapse  07/09/2019: Disorder of the skin and subcutaneous  tissue, unspecified      Comment:  Skin lesion of face  03/21/2019: Encounter for follow-up examination after completed   treatment for conditions other than malignant neoplasm      Comment:  Postop check  10/11/2020: Encounter for other screening for malignant neoplasm of   breast      Comment:  Breast screening  10/15/2021: Encounter for other specified special examinations      Comment:  Encounter for geriatric assessment  No date: Gastro-esophageal reflux disease without esophagitis      Comment:  Gastroesophageal reflux disease without esophagitis  07/29/2020: Hypoxemia      Comment:  Hypoxia  No date: Melanocytic nevi, unspecified      Comment:  Melanocytic nevus  04/27/2017: Myalgia, other site      Comment:  Pain of right deltoid  No date: Other abnormal and inconclusive findings on diagnostic   imaging of breast      Comment:  Abnormal mammography  07/28/2020: Other conditions influencing health status      Comment:  History of cough  05/11/2021: Other forms of dyspnea      Comment:  Dyspnea on effort  11/04/2020: Other general symptoms and signs      Comment:  Suspected angle-closure glaucoma  09/30/2021: Pelvic and perineal pain      Comment:  Female pelvic pain  07/26/2021: Personal history of diseases of the blood and blood-  forming organs and certain disorders involving the immune mechanism      Comment:  History of thrombocytosis  07/08/2020: Personal history of diseases of the skin and subcutaneous   tissue      Comment:  History of alopecia  10/25/2021: Personal history of diseases of the skin and subcutaneous   tissue      Comment:  History of skin ulcer  11/18/2020: Personal history of other diseases of the circulatory   system      Comment:  History of peripheral arterial disease  No date: Personal history of other diseases of the digestive system      Comment:  History of hiatal hernia  05/23/2017: Personal history of other diseases of the nervous system   and sense organs      Comment:   History of eye disorder  No date: Personal history of other endocrine, nutritional and   metabolic disease      Comment:  History of thyroid nodule  11/08/2019: Personal history of other malignant neoplasm of skin      Comment:  History of basal cell carcinoma  07/24/2018: Personal history of other mental and behavioral disorders      Comment:  History of depression  01/30/2019: Personal history of other specified conditions      Comment:  History of dysuria  07/10/2020: Personal history of other specified conditions      Comment:  History of nasal congestion  05/11/2021: Personal history of other specified conditions      Comment:  History of exertional chest pain  07/08/2020: Personal history of other specified conditions      Comment:  History of dysuria  12/13/2018: Personal history of other specified conditions      Comment:  History of urinary incontinence  10/25/2021: Personal history of other specified conditions      Comment:  History of dysuria  02/10/2021: Personal history of pneumonia (recurrent)      Comment:  History of pneumonia  10/13/2021: Personal history of urinary (tract) infections      Comment:  History of urinary tract infection  11/25/2019: Personal history of urinary (tract) infections      Comment:  History of urinary tract infection  01/30/2019: Personal history of urinary (tract) infections      Comment:  History of urinary tract infection  02/10/2021: Shortness of breath      Comment:  SOB (shortness of breath) on exertion  03/21/2019: Stress incontinence (female) (male)      Comment:  Female stress incontinence  No date: Unspecified mood (affective) disorder (CMS-HCC)      Comment:  Affective psychosis  12/20/2021: Urinary tract infection, site not specified      Comment:  Recurrent UTI  10/25/2021: Urinary tract infection, site not specified      Comment:  Acute UTI  07/09/2019: Uterovaginal prolapse, unspecified      Comment:  Cystocele with uterine descensus     Review of patient's  family history indicates:  Problem: Breast cancer      Relation: Sister          Name:               Age of Onset: 35     Active Problems  Problems    · Affective psychosis (296.90) (F39)   · Affective psychosis   · Alopecia (704.00) (L65.9)   · Anxiety (300.00) (F41.9)   · Asymptomatic menopause (V49.81) (Z78.0)   · Atrophic vaginitis (627.3) (N95.2)   · 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)   · Chronic kidney disease, stage 3a (585.3) (N18.31)   · Chronic kidney disease, stage 3a   · Chronic pain of right knee (719.46,338.29) (M25.561,G89.29)   · Dermatitis (692.9) (L30.9)   · Encounter for screening for other disorder (V82.89) (Z13.89)   · Encounter for screening mammogram for breast cancer (V76.12) (Z12.31)   · Encounter for vaccination (V05.9) (Z23)   · Erythema (695.9) (L53.9)   · Glaucoma (365.9) (H40.9)   · Headache, chronic daily (784.0) (R51.9)   · History of skin cancer (V10.83) (Z85.828)   · HTN (hypertension) (401.9) (I10)   · Hyperlipidemia (272.4) (E78.5)   · Hypothyroidism (244.9) (E03.9)   · Medicare annual wellness visit, subsequent (V70.0) (Z00.00)   · Memory changes (780.93) (R41.3)   · Mood disorder (296.90) (F39)   · Osteoporosis (733.00) (M81.0)   · Pelvic floor weakness (618.89) (N81.89)   · Status post right cataract extraction (V45.61) (Z98.41)     Past Medical History  Problems    · History of Abnormal mammography (793.80) (R92.8)   · History of Acute UTI (599.0) (N39.0)   · Resolved Date: 28 Apr 2022   · History of Affective psychosis (296.90) (F39)   · History of Breast screening (V76.10) (Z12.39)   · Resolved Date: 26 Jul 2021   · History of Chronic kidney disease, stage 3a (585.3) (N18.31)   · Resolved Date: 28 Apr 2022   · Chronic kidney disease, stage 3a   · History of COVID-19 (079.89) (U07.1)   · Resolved Date: 26 Jul 2021   · History of Cystocele with uterine descensus (618.4) (N81.4)   · Resolved Date: 18 Jul 2019   · History of Dyspnea on effort (786.09) (R06.09)   · Resolved  Date: 28 Apr 2022   · History of Encounter for geriatric assessment (V72.85) (Z01.89)   · Resolved Date: 28 Apr 2022   · History of Female pelvic pain (625.9) (R10.2)   · Resolved Date: 28 Apr 2022   · History of Female stress incontinence (625.6) (N39.3)   · Resolved Date: 18 Jul 2019   · History of Gastroesophageal reflux disease without esophagitis (530.81) (K21.9)   · History of H/O of hysterectomy with bilateral oophorectomy (V88.01,V45.77)  (Z90.710,Z90.722)   · Resolved Date: 26 Jul 2021   · History of alopecia (V13.89) (Z87.2)   · Resolved Date: 11 Oct 2020   · History of basal cell carcinoma (V10.83) (Z85.828)   · History of cough   · Resolved Date: 26 Jul 2021   · History of depression (V11.8) (Z86.59)   · Resolved Date: 26 Jul 2021   · History of dysuria (V13.00) (Z87.898)   · Resolved Date: 25 Nov 2019   · History of dysuria (V13.00) (Z87.898)   · Resolved Date: 26 Jul 2021   · History of dysuria (V13.00) (Z87.898)   · Resolved Date: 28 Apr 2022   · History of exertional chest pain (V13.89) (Z87.898)   · Resolved Date: 26 Jul 2021   · History of eye disorder (V12.49) (Z86.69)   · Resolved Date: 26 Jul 2021   · History of hiatal hernia (V12.79) (Z87.19)   · History of nasal congestion (V12.69) (Z87.898)   · Resolved Date: 26 Jul 2021   · History of peripheral arterial disease (V12.59) (Z86.79)   · Resolved Date: 20 Nov 2020   · History of pneumonia (V12.61) (Z87.01)   · Resolved Date: 28 Apr 2022   · History of skin ulcer (V13.3) (Z87.2)   · Resolved Date: 28 Apr 2022   · History of thrombocytosis (V12.3) (Z86.2)   · Resolved Date: 28 Apr 2022   · Thrombocytosis   · History of thyroid nodule (V12.29) (Z86.39)   · History of urinary incontinence (V13.09) (Z87.898)   · Resolved Date: 18 Jul 2019   · History of urinary tract infection (V13.02) (Z87.440)   · Resolved Date: 25 Nov 2019   · History of urinary tract infection (V13.02) (Z87.440)   · Resolved Date: 16 Jul 2020   · History of urinary tract  infection (V13.02) (Z87.440)   · Resolved Date: 28 Apr 2022   · History of Hypoxia (799.02) (R09.02)   · Resolved Date: 26 Jul 2021   · History of Melanocytic nevus (216.9) (D22.9)   · History of Pain of right deltoid (729.1) (M79.18)   · Resolved Date: 28 Apr 2022   · History of Postop check (V67.00) (Z09)   · Resolved Date: 18 Jul 2019   · History of Prolapse of vaginal walls without uterine prolapse (618.00) (N81.10)   · History of Recurrent UTI (599.0) (N39.0)   · Resolved Date: 28 Apr 2022   · History of Skin lesion of face (709.9) (L98.9)   · Resolved Date: 28 Apr 2022   · History of SOB (shortness of breath) on exertion (786.05) (R06.02)   · Resolved Date: 26 Jul 2021   · History of Suspected angle-closure glaucoma (780.99) (R68.89)   · History of Vaginal wall prolapse without uterine prolapse (618.00) (N81.10)   · Resolved Date: 14 Feb 2019     Past Psychiatric History   Onset History: Had a total breakdown in 1997.   Inpatient history: None   Suicide attempts/Self-Harm/Ideation History: None   Past providers: At [place] and [facility].   Past medication trials: Quetiapine, nortriptyline.      Surgical History  Problems    · History of Hysterectomy vaginal   · History of Iridectomy peripheral   · History of Left Breast Lumpectomy   · benign tumors   · History of Perineoplasty   · History of Salpingo-oophorectomy bilateral   · History of Tubal Ligation   · History of Uterosacral ligament suspension     Family History  Mother    · Family history of DM II (diabetes mellitus, type II), controlled   · Family history of cardiac disorder (V17.49) (Z82.49)   · Family history of malignant neoplasm of breast (V16.3) (Z80.3)  Father    · Family history of malignant neoplasm of urinary bladder (V16.52) (Z80.52)     Social History  Problems    · Born in [place]   · Completed elementary school   · Completed high school   · Daily caffeine consumption   · Education Level: Some college   · Education Level: Trade  school   · Former smoker (V15.82) (Z87.891)   ·    · No alcohol use   · No illicit drug use   · Number of children   · Has one son and one daughter.   · Retired from employment   · Various clerical jobs.    MSE: The patient is alert, fully oriented, language is intact, and recent and remote memory intact. The patient denies any suicidal or homicidal ideation or plans. The patient presents with no depressive, manic or psychotic symptoms. Thought is logical and clear. No disturbances of judgment or insight are exhibited. No behavioral disturbances are present on examination.        Review of Systems   Neurological:         MSE: The patient is alert, fully oriented, language is intact, and recent and remote memory intact. The patient denies any suicidal or homicidal ideation or plans. The patient presents with no depressive, manic or psychotic symptoms. Thought is logical and clear. No disturbances of judgment or insight are exhibited. No behavioral disturbances are present on examination.     Psychiatric/Behavioral:          MSE: The patient is alert, fully oriented, language is intact, and recent and remote memory intact. The patient denies any suicidal or homicidal ideation or plans. The patient presents with no depressive, manic or psychotic symptoms. Thought is logical and clear. No disturbances of judgment or insight are exhibited. No behavioral disturbances are present on examination.       Psych Review of Symptoms:    ADHD: Patient denied any symptoms.         Anxiety: Patient denied any symptoms.         Developmental and Sensory Concerns: Patient denied any symptoms.         Depressive Symptoms: Patient denied any symptoms.         Disruptive and Conduct Symptoms: Patient denied any symptoms.         Eating / Feeding Concerns: Patient denied any symptoms.         Elimination Symptoms: Patient denied any symptoms.         Manic Symptoms: Patient denied any symptoms.         Obsessive-Compulsive  Symptoms: Patient denied any symptoms.         Psychotic Symptoms: Patient denied any symptoms.           Trauma Related Symptoms: Patient denied any symptoms.           Sleep Concerns: Patient denied any symptoms.             Objective   Physical Exam  Neurological:      Mental Status: She is alert and oriented to person, place, and time.      Comments: MSE: The patient is alert, fully oriented, language is intact, and recent and remote memory intact. The patient denies any suicidal or homicidal ideation or plans. The patient presents with no depressive, manic or psychotic symptoms. Thought is logical and clear. No disturbances of judgment or insight are exhibited. No behavioral disturbances are present on examination.     Psychiatric:         Mood and Affect: Mood normal.         Behavior: Behavior normal.         Thought Content: Thought content normal.         Judgment: Judgment normal.      Comments: MSE: The patient is alert, fully oriented, language is intact, and recent and remote memory intact. The patient denies any suicidal or homicidal ideation or plans. The patient presents with no depressive, manic or psychotic symptoms. Thought is logical and clear. No disturbances of judgment or insight are exhibited. No behavioral disturbances are present on examination.           Lab Review:   Orders Only on 03/10/2025   Component Date Value    TSH 03/10/2025 2.65        Assessment/Plan   Psychiatric Risk Assessment  Violence Risk Assessment: none  Acute Risk of Harm to Others is Considered: low   Suicide Risk Assessment: age > 65 yrs old and   Protective Factors against Suicide: adherence to  treatment, fear of suicide, moral objections to suicide, positive family relationships, and sense of responsibility toward family  Acute Risk of Harm to Self is Considered: low    Imminent Risk of Suicide or Serious Self-Injury: Low   Chronic Risk of Suicide of Serious Self-Injury: Low  Risk factors: Age, depression  history and   Protective factors: Denies current suicidal ideation, denies history of suicide attempts , willingness to seek help and support , gender, access to a variety of clinical interventions , and receiving and engaged in care for mental, physical, and substance use disorders      Imminent Risk of Violence or Homicide: Low   Risk Factors: No significant risk factors identified on screening  Protective Factors: Lack of known history of harm to others , Lack of known history of violent ideation , and lack of known access to firearms.     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     Treatment recommendations:  The risks, benefits & alternatives to the medications prescribed were explained to you today. You were able to understand & repeat these risks, benefits & alternatives to this prescribed medication. You have agreed to proceed with treatment with the medications discussed based on the conclusion that the benefit outweighs the risks of this treatment regimen: continue quetiapine 100 mg daily for treatment of  possible schizoaffecitive bipolar disorder.   Follow up in October of 2025.  Orders:    QUEtiapine (SEROquel) 100 mg tablet; Take 1 tablet (100 mg) by mouth once daily at bedtime.    Time:   Prep time on date of the patient encounter: 5 minutes.   Time spent directly with patient/family/caregiver: 20 minutes.   Additional time spent on patient care activities:  minutes.   Documentation time: 5 minutes.   Total time on date of patient encounter: 30 minutes.

## 2025-05-19 NOTE — ASSESSMENT & PLAN NOTE
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     Treatment recommendations:  The risks, benefits & alternatives to the medications prescribed were explained to you today. You were able to understand & repeat these risks, benefits & alternatives to this prescribed medication. You have agreed to proceed with treatment with the medications discussed based on the conclusion that the benefit outweighs the risks of this treatment regimen: continue quetiapine 100 mg daily for treatment of  possible schizoaffecitive bipolar disorder.   Follow up in October of 2025.  Orders:    QUEtiapine (SEROquel) 100 mg tablet; Take 1 tablet (100 mg) by mouth once daily at bedtime.

## 2025-05-20 ENCOUNTER — APPOINTMENT (OUTPATIENT)
Dept: PRIMARY CARE | Facility: CLINIC | Age: 80
End: 2025-05-20
Payer: MEDICARE

## 2025-05-20 VITALS
SYSTOLIC BLOOD PRESSURE: 100 MMHG | DIASTOLIC BLOOD PRESSURE: 70 MMHG | HEART RATE: 76 BPM | HEIGHT: 64 IN | TEMPERATURE: 97 F | OXYGEN SATURATION: 98 % | BODY MASS INDEX: 22.2 KG/M2 | RESPIRATION RATE: 14 BRPM | WEIGHT: 130 LBS

## 2025-05-20 DIAGNOSIS — Z00.00 ENCOUNTER FOR MEDICARE ANNUAL WELLNESS EXAM: Primary | ICD-10-CM

## 2025-05-20 DIAGNOSIS — Z78.0 ASYMPTOMATIC MENOPAUSE: ICD-10-CM

## 2025-05-20 DIAGNOSIS — Z13.89 ENCOUNTER FOR SCREENING FOR OTHER DISORDER: ICD-10-CM

## 2025-05-20 DIAGNOSIS — J43.9 PULMONARY EMPHYSEMA, UNSPECIFIED EMPHYSEMA TYPE (MULTI): ICD-10-CM

## 2025-05-20 DIAGNOSIS — E78.5 HYPERLIPIDEMIA, UNSPECIFIED HYPERLIPIDEMIA TYPE: ICD-10-CM

## 2025-05-20 DIAGNOSIS — I10 PRIMARY HYPERTENSION: ICD-10-CM

## 2025-05-20 DIAGNOSIS — Z12.31 SCREENING MAMMOGRAM FOR BREAST CANCER: ICD-10-CM

## 2025-05-20 DIAGNOSIS — R09.89 BRUIT OF RIGHT CAROTID ARTERY: ICD-10-CM

## 2025-05-20 DIAGNOSIS — E03.9 HYPOTHYROIDISM, UNSPECIFIED TYPE: ICD-10-CM

## 2025-05-20 DIAGNOSIS — N64.4 BREAST PAIN, LEFT: ICD-10-CM

## 2025-05-20 PROBLEM — R50.9 FEVER: Status: RESOLVED | Noted: 2024-05-15 | Resolved: 2025-05-20

## 2025-05-20 PROBLEM — F31.70 BIPOLAR DISORDER IN FULL REMISSION (MULTI): Status: RESOLVED | Noted: 2017-09-05 | Resolved: 2025-05-20

## 2025-05-20 PROBLEM — R41.3 MEMORY CHANGES: Status: RESOLVED | Noted: 2023-02-14 | Resolved: 2025-05-20

## 2025-05-20 PROBLEM — F39 AFFECTIVE PSYCHOSIS: Status: RESOLVED | Noted: 2023-02-14 | Resolved: 2025-05-20

## 2025-05-20 PROBLEM — F39 MOOD DISORDER: Status: RESOLVED | Noted: 2023-02-14 | Resolved: 2025-05-20

## 2025-05-20 PROBLEM — R09.81 NASAL CONGESTION: Status: RESOLVED | Noted: 2024-05-15 | Resolved: 2025-05-20

## 2025-05-20 PROCEDURE — 1170F FXNL STATUS ASSESSED: CPT | Performed by: FAMILY MEDICINE

## 2025-05-20 PROCEDURE — G0444 DEPRESSION SCREEN ANNUAL: HCPCS | Performed by: FAMILY MEDICINE

## 2025-05-20 PROCEDURE — 1159F MED LIST DOCD IN RCRD: CPT | Performed by: FAMILY MEDICINE

## 2025-05-20 PROCEDURE — 1158F ADVNC CARE PLAN TLK DOCD: CPT | Performed by: FAMILY MEDICINE

## 2025-05-20 PROCEDURE — 1036F TOBACCO NON-USER: CPT | Performed by: FAMILY MEDICINE

## 2025-05-20 PROCEDURE — G0439 PPPS, SUBSEQ VISIT: HCPCS | Performed by: FAMILY MEDICINE

## 2025-05-20 PROCEDURE — 1160F RVW MEDS BY RX/DR IN RCRD: CPT | Performed by: FAMILY MEDICINE

## 2025-05-20 PROCEDURE — 99397 PER PM REEVAL EST PAT 65+ YR: CPT | Performed by: FAMILY MEDICINE

## 2025-05-20 PROCEDURE — G0446 INTENS BEHAVE THER CARDIO DX: HCPCS | Performed by: FAMILY MEDICINE

## 2025-05-20 PROCEDURE — 3074F SYST BP LT 130 MM HG: CPT | Performed by: FAMILY MEDICINE

## 2025-05-20 PROCEDURE — 3078F DIAST BP <80 MM HG: CPT | Performed by: FAMILY MEDICINE

## 2025-05-20 ASSESSMENT — PATIENT HEALTH QUESTIONNAIRE - PHQ9
SUM OF ALL RESPONSES TO PHQ9 QUESTIONS 1 AND 2: 0
2. FEELING DOWN, DEPRESSED OR HOPELESS: NOT AT ALL
1. LITTLE INTEREST OR PLEASURE IN DOING THINGS: NOT AT ALL

## 2025-05-20 ASSESSMENT — ACTIVITIES OF DAILY LIVING (ADL)
GROCERY_SHOPPING: INDEPENDENT
MANAGING_FINANCES: INDEPENDENT
BATHING: INDEPENDENT
DRESSING: INDEPENDENT
DOING_HOUSEWORK: INDEPENDENT
TAKING_MEDICATION: INDEPENDENT

## 2025-05-20 NOTE — PATIENT INSTRUCTIONS
Health Maintenance:  -   Colonoscopy: n/a  -   Mammogram: 3/5/24- ordered  -   PAP: na  -   Bone density DEXA: 9/8/22- ordered  -   Hep C screening- completed  Immunizations due:  - uptodate    Labs due in OCT when you go to your eye appt    Ordered carotid ultrasound      Please follow up in   - 1 yr for medicare wellness  -Labs due in OCT when you go to your eye appt  - or as needed.       ** If labs or imaging ordered at today's visit, all the non-urgent results will be discussed at your next visit    If you have been referred for a special test or to a specialist please call  [phone] to schedule an appointment.  If you have any further questions, or if develop new or worsened symptoms, please give our office a call at [phone].

## 2025-05-20 NOTE — PROGRESS NOTES
"Subjective   Reason for Visit: Mar Bob is an 79 y.o. female here for a Medicare Wellness visit.     Past Medical, Surgical, and Family History reviewed and updated in chart.  Reviewed all medications by prescribing practitioner or clinical pharmacist (such as prescriptions, OTCs, herbal therapies and supplements) and documented in the medical record.    [facility]  Patient Care Team:  Belgica Hardy DO as PCP - General     Health Maintenance:  -   Colonoscopy: n/a  -   Mammogram: 3/5/24- ordered  -   PAP: na  -   Bone density DEXA: 9/8/22- ordered  -   Hep C screening- completed  Immunizations due:  - uptodate    Getting strengthening exercise daily    Has a soreness in the left breast soreness that comes and goes  No nipple drainage    Carotid US 5/15/23- less than 50% b/l stenosis. Compared with study from 7/19/2020, no significant change   Would like a repeat    HTN  BP at goal today in office.  Using medications without issues.  Denies CP, SOB, palpitations, change in vision, dizziness, N/V.      Review of Systems    All systems reviewed and neg if not noted in the HPI above     Objective   Vitals:  /70 (Patient Position: Sitting)   Pulse 76   Temp 36.1 °C (97 °F)   Resp 14   Ht 1.626 m (5' 4\")   Wt 59 kg (130 lb)   SpO2 98%   BMI 22.31 kg/m²       Physical Exam  Constitutional:       Appearance: Normal appearance.   HENT:      Head: Normocephalic.      Right Ear: External ear normal.      Left Ear: External ear normal.      Nose: Nose normal.      Mouth/Throat:      Pharynx: Oropharynx is clear.   Eyes:      Extraocular Movements: Extraocular movements intact.   Neck:      Thyroid: No thyroid mass, thyromegaly or thyroid tenderness.      Vascular: No carotid bruit.   Cardiovascular:      Rate and Rhythm: Normal rate and regular rhythm.      Pulses:           Carotid pulses are  on the left side with bruit.     Heart sounds: Normal heart sounds. No murmur heard.  Pulmonary:      Effort: Pulmonary " effort is normal. No respiratory distress.      Breath sounds: Normal breath sounds. No wheezing.   Chest:      Comments: Declined breast exam  Abdominal:      General: Bowel sounds are normal.      Palpations: Abdomen is soft. There is no mass.      Tenderness: There is no abdominal tenderness.   Musculoskeletal:         General: Normal range of motion.      Cervical back: Normal range of motion.      Right lower leg: No edema.      Left lower leg: No edema.   Skin:     General: Skin is warm and dry.      Findings: No rash.   Neurological:      General: No focal deficit present.      Mental Status: She is alert and oriented to person, place, and time.      Motor: No weakness.   Psychiatric:         Mood and Affect: Mood normal.         Behavior: Behavior normal.         Cognition and Memory: Cognition normal.      Comments: Clock drawing- normal           Assessment & Plan  Encounter for Medicare annual wellness exam    Orders:    CBC and Auto Differential; Future    Comprehensive Metabolic Panel; Future    Lipid Panel; Future    TSH with reflex to Free T4 if abnormal; Future    Encounter for screening for other disorder         Bruit of right carotid artery    Orders:    Vascular US Carotid Artery Duplex Bilateral; Future    Comprehensive Metabolic Panel; Future    Lipid Panel; Future    Breast pain, left    Orders:    BI mammo left diagnostic; Future    BI US breast complete left; Future    Screening mammogram for breast cancer    Orders:    BI mammo bilateral screening tomosynthesis; Future    Pulmonary emphysema, unspecified emphysema type (Multi)  Seen on CT while with covid  Doing much better now  No cough/ sob  Will continue to monitor for now         Asymptomatic menopause    Orders:    XR DEXA bone density; Future    Hypothyroidism, unspecified type    Orders:    CBC and Auto Differential; Future    TSH with reflex to Free T4 if abnormal; Future    Primary hypertension    Orders:    CBC and Auto  Differential; Future    Comprehensive Metabolic Panel; Future    TSH with reflex to Free T4 if abnormal; Future    Hyperlipidemia, unspecified hyperlipidemia type    Orders:    CBC and Auto Differential; Future    Lipid Panel; Future    TSH with reflex to Free T4 if abnormal; Future       Please follow up in   - 1 yr for medicare wellness  -Labs due in OCT when you go to your eye appt  - or as needed.         Cardiac Risk Assessment  15 - 20 minutes were spent discussing Cardiovascular risk and, if needed, lifestyle modifications were recommended, including nutritional choices, exercise, and elimination of habits contributing to risk.   Aspirin use/disuse was discussed following the guidelines below:  low dose ASA ( mg) should be considered:    If prior Heart Attack/Stroke/Peripheral vascular disease:  Generally recommend daily low dose aspirin unless extremely high bleeding risk (e.g., gastrointestinal).    If no prior Heart Attack/Stroke/Peripheral vascular disease:              Age over 70: Do not use Aspirin for prevention    Age less than 70 and 10-year cardiovascular disease risk is >20%: use low dose Aspirin for prevention.                 Depression Screening  5 - 10 minutes were spent screening for depression.

## 2025-05-20 NOTE — ASSESSMENT & PLAN NOTE
Orders:    Vascular US Carotid Artery Duplex Bilateral; Future    Comprehensive Metabolic Panel; Future    Lipid Panel; Future

## 2025-05-20 NOTE — ASSESSMENT & PLAN NOTE
Seen on CT while with covid  Doing much better now  No cough/ sob  Will continue to monitor for now

## 2025-06-02 ENCOUNTER — ANCILLARY PROCEDURE (OUTPATIENT)
Dept: VASCULAR MEDICINE | Facility: CLINIC | Age: 80
End: 2025-06-02
Payer: MEDICARE

## 2025-06-02 DIAGNOSIS — R42 DIZZINESS AND GIDDINESS: ICD-10-CM

## 2025-06-02 DIAGNOSIS — R09.89 BRUIT OF RIGHT CAROTID ARTERY: ICD-10-CM

## 2025-06-02 PROCEDURE — 93880 EXTRACRANIAL BILAT STUDY: CPT

## 2025-06-02 PROCEDURE — 93880 EXTRACRANIAL BILAT STUDY: CPT | Performed by: SURGERY

## 2025-06-05 ENCOUNTER — HOSPITAL ENCOUNTER (OUTPATIENT)
Dept: RADIOLOGY | Facility: CLINIC | Age: 80
Discharge: HOME | End: 2025-06-05
Payer: MEDICARE

## 2025-06-05 VITALS — BODY MASS INDEX: 22.2 KG/M2 | WEIGHT: 130 LBS | HEIGHT: 64 IN

## 2025-06-05 DIAGNOSIS — Z78.0 ASYMPTOMATIC MENOPAUSE: ICD-10-CM

## 2025-06-05 DIAGNOSIS — Z12.31 SCREENING MAMMOGRAM FOR BREAST CANCER: ICD-10-CM

## 2025-06-05 PROCEDURE — 77067 SCR MAMMO BI INCL CAD: CPT

## 2025-06-05 PROCEDURE — 77080 DXA BONE DENSITY AXIAL: CPT | Performed by: RADIOLOGY

## 2025-06-05 PROCEDURE — 77063 BREAST TOMOSYNTHESIS BI: CPT | Performed by: RADIOLOGY

## 2025-06-05 PROCEDURE — 77067 SCR MAMMO BI INCL CAD: CPT | Performed by: RADIOLOGY

## 2025-06-05 PROCEDURE — 77080 DXA BONE DENSITY AXIAL: CPT

## 2025-06-14 DIAGNOSIS — I10 HYPERTENSION, UNSPECIFIED TYPE: ICD-10-CM

## 2025-06-14 DIAGNOSIS — K21.9 GASTROESOPHAGEAL REFLUX DISEASE, UNSPECIFIED WHETHER ESOPHAGITIS PRESENT: ICD-10-CM

## 2025-06-17 RX ORDER — LISINOPRIL 10 MG/1
10 TABLET ORAL DAILY
Qty: 90 TABLET | Refills: 3 | Status: SHIPPED | OUTPATIENT
Start: 2025-06-17

## 2025-06-17 RX ORDER — OMEPRAZOLE 20 MG/1
20 CAPSULE, DELAYED RELEASE ORAL DAILY
Qty: 90 CAPSULE | Refills: 0 | Status: SHIPPED | OUTPATIENT
Start: 2025-06-17

## 2025-06-30 DIAGNOSIS — I65.23 ATHEROSCLEROSIS OF BOTH CAROTID ARTERIES: Primary | ICD-10-CM

## 2025-07-17 ENCOUNTER — APPOINTMENT (OUTPATIENT)
Dept: DERMATOLOGY | Facility: CLINIC | Age: 80
End: 2025-07-17
Payer: MEDICARE

## 2025-08-04 DIAGNOSIS — E78.5 HYPERLIPIDEMIA, UNSPECIFIED HYPERLIPIDEMIA TYPE: ICD-10-CM

## 2025-08-06 RX ORDER — ATORVASTATIN CALCIUM 40 MG/1
40 TABLET, FILM COATED ORAL NIGHTLY
Qty: 90 TABLET | Refills: 3 | Status: SHIPPED | OUTPATIENT
Start: 2025-08-06

## 2025-08-18 ENCOUNTER — APPOINTMENT (OUTPATIENT)
Dept: URGENT CARE | Age: 80
End: 2025-08-18
Payer: MEDICARE

## 2025-08-18 ENCOUNTER — OFFICE VISIT (OUTPATIENT)
Dept: URGENT CARE | Age: 80
End: 2025-08-18
Payer: MEDICARE

## 2025-08-18 DIAGNOSIS — R39.89 SUSPECTED UTI: ICD-10-CM

## 2025-08-18 DIAGNOSIS — R30.0 DYSURIA: Primary | ICD-10-CM

## 2025-08-18 PROCEDURE — 1160F RVW MEDS BY RX/DR IN RCRD: CPT | Performed by: PHYSICIAN ASSISTANT

## 2025-08-18 PROCEDURE — 99203 OFFICE O/P NEW LOW 30 MIN: CPT | Performed by: PHYSICIAN ASSISTANT

## 2025-08-18 PROCEDURE — 1036F TOBACCO NON-USER: CPT | Performed by: PHYSICIAN ASSISTANT

## 2025-08-18 PROCEDURE — 1159F MED LIST DOCD IN RCRD: CPT | Performed by: PHYSICIAN ASSISTANT

## 2025-08-18 RX ORDER — CEPHALEXIN 500 MG/1
500 CAPSULE ORAL 2 TIMES DAILY
Qty: 14 CAPSULE | Refills: 0 | Status: SHIPPED | OUTPATIENT
Start: 2025-08-18 | End: 2025-08-25

## 2025-09-04 ENCOUNTER — OFFICE VISIT (OUTPATIENT)
Dept: CARDIOLOGY | Facility: CLINIC | Age: 80
End: 2025-09-04
Payer: MEDICARE

## 2025-09-04 ENCOUNTER — OFFICE VISIT (OUTPATIENT)
Dept: URGENT CARE | Age: 80
End: 2025-09-04
Payer: MEDICARE

## 2025-09-04 VITALS
HEART RATE: 66 BPM | SYSTOLIC BLOOD PRESSURE: 143 MMHG | RESPIRATION RATE: 16 BRPM | OXYGEN SATURATION: 92 % | TEMPERATURE: 98 F | DIASTOLIC BLOOD PRESSURE: 77 MMHG

## 2025-09-04 VITALS
BODY MASS INDEX: 22.14 KG/M2 | SYSTOLIC BLOOD PRESSURE: 153 MMHG | WEIGHT: 129 LBS | DIASTOLIC BLOOD PRESSURE: 71 MMHG | HEART RATE: 76 BPM | OXYGEN SATURATION: 92 %

## 2025-09-04 DIAGNOSIS — R82.81 PYURIA: Primary | ICD-10-CM

## 2025-09-04 DIAGNOSIS — R30.0 DYSURIA: ICD-10-CM

## 2025-09-04 DIAGNOSIS — I73.00 RAYNAUD'S DISEASE WITHOUT GANGRENE: ICD-10-CM

## 2025-09-04 DIAGNOSIS — E78.5 DYSLIPIDEMIA: ICD-10-CM

## 2025-09-04 DIAGNOSIS — I10 PRIMARY HYPERTENSION: ICD-10-CM

## 2025-09-04 DIAGNOSIS — E03.9 HYPOTHYROIDISM, UNSPECIFIED TYPE: ICD-10-CM

## 2025-09-04 DIAGNOSIS — I65.23 ATHEROSCLEROSIS OF BOTH CAROTID ARTERIES: Primary | ICD-10-CM

## 2025-09-04 LAB
POC APPEARANCE, URINE: CLEAR
POC BILIRUBIN, URINE: NEGATIVE
POC BLOOD, URINE: ABNORMAL
POC COLOR, URINE: ABNORMAL
POC GLUCOSE, URINE: NEGATIVE MG/DL
POC KETONES, URINE: NEGATIVE MG/DL
POC LEUKOCYTES, URINE: ABNORMAL
POC NITRITE,URINE: POSITIVE
POC PH, URINE: 6 PH
POC PROTEIN, URINE: ABNORMAL MG/DL
POC SPECIFIC GRAVITY, URINE: 1.01
POC UROBILINOGEN, URINE: 0.2 EU/DL

## 2025-09-04 PROCEDURE — 1159F MED LIST DOCD IN RCRD: CPT | Performed by: INTERNAL MEDICINE

## 2025-09-04 PROCEDURE — 3078F DIAST BP <80 MM HG: CPT | Performed by: INTERNAL MEDICINE

## 2025-09-04 PROCEDURE — 1160F RVW MEDS BY RX/DR IN RCRD: CPT | Performed by: INTERNAL MEDICINE

## 2025-09-04 PROCEDURE — 99204 OFFICE O/P NEW MOD 45 MIN: CPT | Performed by: INTERNAL MEDICINE

## 2025-09-04 PROCEDURE — 99212 OFFICE O/P EST SF 10 MIN: CPT

## 2025-09-04 PROCEDURE — 3074F SYST BP LT 130 MM HG: CPT | Performed by: INTERNAL MEDICINE

## 2025-09-04 RX ORDER — AMOXICILLIN AND CLAVULANATE POTASSIUM 875; 125 MG/1; MG/1
875 TABLET, FILM COATED ORAL 2 TIMES DAILY
Qty: 20 TABLET | Refills: 0 | Status: SHIPPED | OUTPATIENT
Start: 2025-09-04

## 2025-09-04 RX ORDER — ATORVASTATIN CALCIUM 80 MG/1
80 TABLET, FILM COATED ORAL DAILY
Qty: 30 TABLET | Refills: 11 | Status: SHIPPED | OUTPATIENT
Start: 2025-09-04 | End: 2026-09-04

## 2025-09-04 ASSESSMENT — ENCOUNTER SYMPTOMS: DYSURIA: 1

## 2025-09-07 LAB — BACTERIA UR CULT: ABNORMAL

## 2025-10-02 ENCOUNTER — APPOINTMENT (OUTPATIENT)
Dept: OPHTHALMOLOGY | Facility: CLINIC | Age: 80
End: 2025-10-02
Payer: MEDICARE

## 2025-10-13 ENCOUNTER — APPOINTMENT (OUTPATIENT)
Dept: BEHAVIORAL HEALTH | Facility: CLINIC | Age: 80
End: 2025-10-13
Payer: MEDICARE

## 2025-10-30 ENCOUNTER — APPOINTMENT (OUTPATIENT)
Dept: OPHTHALMOLOGY | Facility: CLINIC | Age: 80
End: 2025-10-30
Payer: MEDICARE

## 2026-01-08 ENCOUNTER — APPOINTMENT (OUTPATIENT)
Dept: CARDIOLOGY | Facility: CLINIC | Age: 81
End: 2026-01-08
Payer: MEDICARE

## 2026-05-21 ENCOUNTER — APPOINTMENT (OUTPATIENT)
Dept: PRIMARY CARE | Facility: CLINIC | Age: 81
End: 2026-05-21
Payer: MEDICARE